# Patient Record
Sex: FEMALE | Race: WHITE | Employment: FULL TIME | URBAN - NONMETROPOLITAN AREA
[De-identification: names, ages, dates, MRNs, and addresses within clinical notes are randomized per-mention and may not be internally consistent; named-entity substitution may affect disease eponyms.]

---

## 2020-10-19 ENCOUNTER — HOSPITAL ENCOUNTER (OUTPATIENT)
Dept: ULTRASOUND IMAGING | Age: 50
Discharge: HOME | End: 2020-10-19
Payer: COMMERCIAL

## 2020-10-19 PROCEDURE — 76830 TRANSVAGINAL US NON-OB: CPT

## 2020-10-20 ENCOUNTER — TELEPHONE (OUTPATIENT)
Dept: OBGYN | Age: 50
End: 2020-10-20

## 2020-10-20 NOTE — TELEPHONE ENCOUNTER
Pt returned your call. I was not sure what you were needing to tell her she has an appt on 10/27/2020 to f/u from her us.  Shi/JONAS

## 2020-10-22 NOTE — TELEPHONE ENCOUNTER
Pt calling back for U/S results and inform pt that David Monsivais is wanting to review them at her visit next week.  Pt VU

## 2020-10-27 ENCOUNTER — OFFICE VISIT (OUTPATIENT)
Dept: OBGYN | Age: 50
End: 2020-10-27
Payer: COMMERCIAL

## 2020-10-27 VITALS
DIASTOLIC BLOOD PRESSURE: 69 MMHG | HEART RATE: 80 BPM | BODY MASS INDEX: 18.83 KG/M2 | HEIGHT: 65 IN | WEIGHT: 113 LBS | SYSTOLIC BLOOD PRESSURE: 110 MMHG

## 2020-10-27 PROCEDURE — 58100 BIOPSY OF UTERUS LINING: CPT | Performed by: ADVANCED PRACTICE MIDWIFE

## 2020-10-27 PROCEDURE — 99386 PREV VISIT NEW AGE 40-64: CPT | Performed by: ADVANCED PRACTICE MIDWIFE

## 2020-10-27 PROCEDURE — 99204 OFFICE O/P NEW MOD 45 MIN: CPT | Performed by: ADVANCED PRACTICE MIDWIFE

## 2020-10-27 RX ORDER — METRONIDAZOLE 500 MG/1
TABLET ORAL
COMMUNITY
Start: 2020-10-19 | End: 2020-11-06

## 2020-10-27 ASSESSMENT — ENCOUNTER SYMPTOMS
GASTROINTESTINAL NEGATIVE: 1
ALLERGIC/IMMUNOLOGIC NEGATIVE: 1
RESPIRATORY NEGATIVE: 1
EYES NEGATIVE: 1

## 2020-10-27 NOTE — PATIENT INSTRUCTIONS
Patient Education        Well Visit, Ages 25 to 48: Care Instructions  Your Care Instructions     Physical exams can help you stay healthy. Your doctor has checked your overall health and may have suggested ways to take good care of yourself. He or she also may have recommended tests. At home, you can help prevent illness with healthy eating, regular exercise, and other steps. Follow-up care is a key part of your treatment and safety. Be sure to make and go to all appointments, and call your doctor if you are having problems. It's also a good idea to know your test results and keep a list of the medicines you take. How can you care for yourself at home? · Reach and stay at a healthy weight. This will lower your risk for many problems, such as obesity, diabetes, heart disease, and high blood pressure. · Get at least 30 minutes of physical activity on most days of the week. Walking is a good choice. You also may want to do other activities, such as running, swimming, cycling, or playing tennis or team sports. Discuss any changes in your exercise program with your doctor. · Do not smoke or allow others to smoke around you. If you need help quitting, talk to your doctor about stop-smoking programs and medicines. These can increase your chances of quitting for good. · Talk to your doctor about whether you have any risk factors for sexually transmitted infections (STIs). Having one sex partner (who does not have STIs and does not have sex with anyone else) is a good way to avoid these infections. · Use birth control if you do not want to have children at this time. Talk with your doctor about the choices available and what might be best for you. · Protect your skin from too much sun. When you're outdoors from 10 a.m. to 4 p.m., stay in the shade or cover up with clothing and a hat with a wide brim. Wear sunglasses that block UV rays.  Even when it's cloudy, put broad-spectrum sunscreen (SPF 30 or higher) on any exposed skin. · See a dentist one or two times a year for checkups and to have your teeth cleaned. · Wear a seat belt in the car. Follow your doctor's advice about when to have certain tests. These tests can spot problems early. For everyone  · Cholesterol. Have the fat (cholesterol) in your blood tested after age 21. Your doctor will tell you how often to have this done based on your age, family history, or other things that can increase your risk for heart disease. · Blood pressure. Have your blood pressure checked during a routine doctor visit. Your doctor will tell you how often to check your blood pressure based on your age, your blood pressure results, and other factors. · Vision. Talk with your doctor about how often to have a glaucoma test.  · Diabetes. Ask your doctor whether you should have tests for diabetes. · Colon cancer. Your risk for colorectal cancer gets higher as you get older. Some experts say that adults should start regular screening at age 48 and stop at age 76. Others say to start before age 48 or continue after age 76. Talk with your doctor about your risk and when to start and stop screening. For women  · Breast exam and mammogram. Talk to your doctor about when you should have a clinical breast exam and a mammogram. Medical experts differ on whether and how often women under 50 should have these tests. Your doctor can help you decide what is right for you. · Cervical cancer screening test and pelvic exam. Begin with a Pap test at age 24. The test often is part of a pelvic exam. Starting at age 27, you may choose to have a Pap test, an HPV test, or both tests at the same time (called co-testing). Talk with your doctor about how often to have testing. · Tests for sexually transmitted infections (STIs). Ask whether you should have tests for STIs. You may be at risk if you have sex with more than one person, especially if your partners do not wear condoms.   For men  · Tests for sexually transmitted infections (STIs). Ask whether you should have tests for STIs. You may be at risk if you have sex with more than one person, especially if you do not wear a condom. · Testicular cancer exam. Ask your doctor whether you should check your testicles regularly. · Prostate exam. Talk to your doctor about whether you should have a blood test (called a PSA test) for prostate cancer. Experts differ on whether and when men should have this test. Some experts suggest it if you are older than 39 and are -American or have a father or brother who got prostate cancer when he was younger than 72. When should you call for help? Watch closely for changes in your health, and be sure to contact your doctor if you have any problems or symptoms that concern you. Where can you learn more? Go to https://Tradeasi SolutionspeWeareweb.healthTactoTek. org and sign in to your VoipSwitch account. Enter P072 in the [facility] [place] box to learn more about \"Well Visit, Ages 25 to 48: Care Instructions. \"     If you do not have an account, please click on the \"Sign Up Now\" link. Current as of: May 27, 2020               Content Version: 12.6  © 4614-0485 [employer]. Care instructions adapted under license by [facility] [employer] [facility] ([facility]). If you have questions about a medical condition or this instruction, always ask your healthcare professional. [address] any warranty or liability for your use of this information. Patient Education        Abnormal Uterine Bleeding: Care Instructions  Your Care Instructions     Abnormal uterine bleeding is irregular bleeding from the uterus that is longer or heavier than usual or does not occur at your regular time. Sometimes it is caused by changes in hormone levels. It can also be caused by growths in the uterus, such as fibroids or polyps. Sometimes a cause cannot be found. You may have heavy bleeding when you are not expecting your period.  Your doctor may suggest a pregnancy test, if you think you are pregnant. Follow-up care is a key part of your treatment and safety. Be sure to make and go to all appointments, and call your doctor if you are having problems. It's also a good idea to know your test results and keep a list of the medicines you take. How can you care for yourself at home? · Be safe with medicines. Take pain medicines exactly as directed. ? If the doctor gave you a prescription medicine for pain, take it as prescribed. ? If you are not taking a prescription pain medicine, ask your doctor if you can take an over-the-counter medicine. · You may be low in iron because of blood loss. Eat a balanced diet that is high in iron and vitamin C. Foods rich in iron include red meat, shellfish, eggs, beans, and leafy green vegetables. Talk to your doctor about whether you need to take iron pills or a multivitamin. When should you call for help? Call 911 anytime you think you may need emergency care. For example, call if:    · You passed out (lost consciousness). Call your doctor now or seek immediate medical care if:    · You have new or worse belly or pelvic pain.     · You have severe vaginal bleeding.     · You feel dizzy or lightheaded, or you feel like you may faint. Watch closely for changes in your health, and be sure to contact your doctor if:    · You think you may be pregnant.     · Your bleeding gets worse.     · You do not get better as expected. Where can you learn more? Go to https://VoiceGemmaikel.OnTrak Software. org and sign in to your OneSpin Solutions account. Enter G452 in the [facility] box to learn more about \"Abnormal Uterine Bleeding: Care Instructions. \"     If you do not have an account, please click on the \"Sign Up Now\" link. Current as of: November 8, 2019               Content Version: 12.6  © 3580-1263 [employer]. Care instructions adapted under license by [facility] ([facility]).  If you have questions about a for yourself at home? · Ask your doctor if you can take an over-the-counter pain medicine, such as acetaminophen (Tylenol), ibuprofen (Advil, Motrin), or naproxen (Aleve). Be safe with medicines. Read and follow all instructions on the label. · Use pads or tampons for vaginal bleeding or discharge. · You may return to all your usual activities (including sex) when you feel like it. Follow-up care is a key part of your treatment and safety. Be sure to make and go to all appointments, and call your doctor if you are having problems. It's also a good idea to keep a list of the medicines you take. Ask your doctor when you can expect to have your test results. Where can you learn more? Go to https://luma-idpepicNumbrs AGeb.Aradigm. org and sign in to your Ayi Laile account. Enter [phone] in the [employer] box to learn more about \"Endometrial Biopsy: About This Test.\"     If you do not have an account, please click on the \"Sign Up Now\" link. Current as of: November 8, 2019               Content Version: 12.6  © 9963-8393 [employer]. Care instructions adapted under license by [facility] ([facility]). If you have questions about a medical condition or this instruction, always ask your healthcare professional. [address] any warranty or liability for your use of this information.

## 2020-10-27 NOTE — PROGRESS NOTES
Pt presents today for pap smear and breast exam.    Last mammogram: 2019 in [place]  Last pap smear:  approx, negative  Contraception: TL  : 4  Para: 4  AB: 0  Last bone density: never   Last colonoscopy: never  Menarche: 17years old  LMP: 10-4-2020  Menses: irregular
or discharge. Cervix is normal, smooth pink mucosa. No Lesions, no polyps. Musculoskeletal: Normal range of motion. Skin:     General: Skin is warm and dry. Capillary Refill: Capillary refill takes less than 2 seconds. Neurological:      Mental Status: She is alert and oriented to person, place, and time. Psychiatric:         Behavior: Behavior normal.         Thought Content: Thought content normal.         Judgment: Judgment normal.       Lesly Lora is a 52 y.o.  with history of menorrhagia who presents today for endometrial biopsy. /69 (Site: Left Upper Arm, Position: Sitting, Cuff Size: Medium Adult)   Pulse 80   Ht 5' 5\" (1.651 m)   Wt 113 lb (51.3 kg)   LMP 10/04/2020 (Exact Date)   Breastfeeding No   BMI 18.80 kg/m²     Endometrial Biopsy Procedure Note    Pre-operative Diagnosis: AUB    Post-operative Diagnosis: same    Indications: abnormal uterine bleeding    Procedure Details    Urine pregnancy test was not done. The risks (including infection, bleeding, pain, and uterine perforation) and benefits of the procedure were explained to the patient and Written informed consent was obtained. The patient was placed in the dorsal lithotomy position. Speculum inserted in the vagina, and the cervix prepped with povidone iodine. Single tooth tenaculum applied to anterior cervical lip. Uterus was sounded to 7. Pipelle endometrial aspirator was inserted and gentle pressure applied. Adequate tissue sample obtained. Sent for pathology. Pt tolerated well. Condition:  Stable    Complications:  None    Plan:    The patient was advised to call for any fever or for prolonged or severe pain or bleeding. She was advised to use OTC ibuprofen as needed for mild to moderate pain. She was advised to avoid vaginal intercourse for 48 hours or until the bleeding has completely stopped. Diagnosis Orders   1.  Encounter for well woman exam with routine gynecological exam  PAP SMEAR

## 2020-10-29 RX ORDER — MEDROXYPROGESTERONE ACETATE 10 MG/1
10 TABLET ORAL DAILY
Qty: 10 TABLET | Refills: 0 | Status: SHIPPED | OUTPATIENT
Start: 2020-10-29 | End: 2020-11-09

## 2020-11-03 LAB
HPV COMMENT: ABNORMAL
HPV TYPE 16: NOT DETECTED
HPV TYPE 18: NOT DETECTED
HPVOH (OTHER TYPES): DETECTED

## 2020-11-06 ENCOUNTER — OFFICE VISIT (OUTPATIENT)
Dept: OBGYN | Age: 50
End: 2020-11-06

## 2020-11-06 VITALS
BODY MASS INDEX: 18.83 KG/M2 | HEIGHT: 65 IN | HEART RATE: 61 BPM | SYSTOLIC BLOOD PRESSURE: 113 MMHG | TEMPERATURE: 98.4 F | DIASTOLIC BLOOD PRESSURE: 71 MMHG | WEIGHT: 113 LBS

## 2020-11-06 PROCEDURE — PREOPEXAM PRE-OP EXAM: Performed by: OBSTETRICS & GYNECOLOGY

## 2020-11-06 ASSESSMENT — ENCOUNTER SYMPTOMS
EYES NEGATIVE: 1
RESPIRATORY NEGATIVE: 1
GASTROINTESTINAL NEGATIVE: 1

## 2020-11-06 NOTE — PROGRESS NOTES
I, Lucia Pizarro RN, am scribing for and in the presence of Dr. Sean Zimmerman 2020/1:43 PM/sign    Subjective:      Patient ID: Pantera Hutson is a 52 y.o. female. HPI    Patient is here for a preop visit. She is scheduled for a D&C, hysteroscopy, and Novasure ablation due to abnormal uterine bleeding and thickened endometrium. Recent TVUS showed   1.  1.8 cm and 1.5 cm RIGHT ovarian cysts with thick walls and    internal echoes. Differential includes corpus luteal cyst and    hemorrhagic cysts. Recommend a follow-up pelvic ultrasound in 6-8    weeks to evaluate for stability or resolution. 2.  Endometrial stripe is 10 mm in thickness, within normal limits    assuming premenopausal state. 3.  Multiple small subcentimeter uterine fibroids. She has been having abnormal and heavy bleeding for the past several months. She is alos c/o hot flashes and night sweats. Pantera Hutson is a 52 y.o. female with the following history as recorded in [facility]: There are no active problems to display for this patient. Current Outpatient Medications   Medication Sig Dispense Refill    medroxyPROGESTERone (PROVERA) 10 MG tablet Take 1 tablet by mouth daily 10 tablet 0     No current facility-administered medications for this visit. Allergies: Patient has no known allergies. History reviewed. No pertinent past medical history. Past Surgical History:   Procedure Laterality Date    BREAST ENHANCEMENT SURGERY  2019    CERVICAL CERCLAGE      x4; was put in during every pregnancy then take it out; due to stage 4 dysplagia     SECTION      x2    DILATION AND CURETTAGE OF UTERUS      due to stage 4 dysplagia    TUBAL LIGATION      Dr. Jorge Washburn     History reviewed. No pertinent family history.   Social History     Tobacco Use    Smoking status: Current Every Day Smoker    Smokeless tobacco: Never Used   Substance Use Topics    Alcohol use: Yes     Comment: rarely       Review of Systems Constitutional: Negative. HENT: Negative. Eyes: Negative. Respiratory: Negative. Cardiovascular: Negative. Gastrointestinal: Negative. Genitourinary: Negative for difficulty urinating, dyspareunia, dysuria, enuresis, frequency, hematuria, menstrual problem, pelvic pain, urgency and vaginal discharge. Musculoskeletal: Negative. Skin: Negative. Neurological: Negative. Psychiatric/Behavioral: Negative. Objective:/71 (Site: Left Upper Arm, Position: Sitting, Cuff Size: Medium Adult)   Pulse 61   Temp 98.4 °F (36.9 °C) (Temporal)   Ht 5' 5\" (1.651 m)   Wt 113 lb (51.3 kg)   LMP 10/30/2020   BMI 18.80 kg/m²      Physical Exam  Vitals signs and nursing note reviewed. Constitutional:       General: She is not in acute distress. Appearance: She is well-developed. She is not diaphoretic. HENT:      Head: Normocephalic and atraumatic. Eyes:      Conjunctiva/sclera: Conjunctivae normal.      Pupils: Pupils are equal, round, and reactive to light. Neck:      Musculoskeletal: Normal range of motion. Pulmonary:      Effort: Pulmonary effort is normal.   Abdominal:      Tenderness: There is no guarding. Musculoskeletal: Normal range of motion. Comments: Normal ROM in all 4 extremities; normal gait   Skin:     General: Skin is warm and dry. Neurological:      Mental Status: She is alert and oriented to person, place, and time. Motor: No abnormal muscle tone. Coordination: Coordination normal.   Psychiatric:         Behavior: Behavior normal.         Assessment:       Diagnosis Orders   1. Abnormal uterine bleeding (AUB)     2. Thickened endometrium     3. Hot flashes  Follicle Stimulating Hormone   4. Night sweats  Follicle Stimulating Hormone           Plan:       MEDICATIONS:  No orders of the defined types were placed in this encounter.       ORDERS:  Orders Placed This Encounter   Procedures    Follicle Stimulating Hormone       Counseling was offered and accepted by patient. Discussed at length the risks, benefits and alternatives to surgery including medical management and no intervention. Pt is in agreement with surgery. Some patients will need a full medical clearance. Preop testing ordered and we will review scheduling to determine date and time. Will add [address] level to preop labs Consents for surgery signed and all questions  proceeding. All questions answered and  patient voiced understanding of above. Pamela Vance MD, personally performed services described in this document as scribed by Melissa Cornejo RN in my presence, and it is both accurate and complete.

## 2020-11-06 NOTE — PATIENT INSTRUCTIONS
Patient Education        Operative Hysteroscopy: Before Your Procedure  What is an operative hysteroscopy? An operative hysteroscopy is a procedure to find and treat problems with your uterus. It may be done to remove growths from the uterus. It may also be used to treat fertility problems or abnormal bleeding. The doctor will guide a lighted tube through the cervix and into the uterus. This tube is called a hysteroscope, or scope. The doctor will fill your uterus with liquid. This makes it easier to see the inside of your uterus with the scope. Then the doctor will put tools through the scope to do the procedure. Most of the liquid that the doctor puts in will flow out when the scope is removed. You will most likely go home the same day. Many women are able to go back to work the next day. But it depends on what was done and the type of work you do. Follow-up care is a key part of your treatment and safety. Be sure to make and go to all appointments, and call your doctor if you are having problems. It's also a good idea to know your test results and keep a list of the medicines you take. What happens before the procedure? Preparing for the procedure    · Understand exactly what procedure is planned, along with the risks, benefits, and other options. · Tell your doctors ALL the medicines, vitamins, supplements, and herbal remedies you take. Some of these can increase the risk of bleeding or interact with anesthesia.     · If you take aspirin or some other blood thinner, ask your doctor if you should stop taking it before your procedure. Make sure that you understand exactly what your doctor wants you to do. These medicines increase the risk of bleeding.     · Your doctor will tell you which medicines to take or stop before your procedure. You may need to stop taking certain medicines a week or more before the procedure.  So talk to your doctor as soon as you can.     · If you have an advance directive, always ask your healthcare professional. [place] [address] any warranty or liability for your use of this information.

## 2020-11-09 ENCOUNTER — HOSPITAL ENCOUNTER (OUTPATIENT)
Dept: PREADMISSION TESTING | Age: 50
Discharge: HOME | End: 2020-11-13
Payer: COMMERCIAL

## 2020-11-09 VITALS — WEIGHT: 113 LBS | HEIGHT: 65 IN | BODY MASS INDEX: 18.83 KG/M2

## 2020-11-09 LAB
BASOPHILS ABSOLUTE: 0 K/UL (ref 0–0.2)
BASOPHILS RELATIVE PERCENT: 0.6 % (ref 0–1)
EOSINOPHILS ABSOLUTE: 0.2 K/UL (ref 0–0.6)
EOSINOPHILS RELATIVE PERCENT: 3.3 % (ref 0–5)
FOLLICLE STIMULATING HORMONE: 28.5 MIU/ML
HCT VFR BLD CALC: 38.8 % (ref 37–47)
HEMOGLOBIN: 13.2 G/DL (ref 12–16)
IMMATURE GRANULOCYTES #: 0 K/UL
LYMPHOCYTES ABSOLUTE: 2.8 K/UL (ref 1.1–4.5)
LYMPHOCYTES RELATIVE PERCENT: 40.7 % (ref 20–40)
MCH RBC QN AUTO: 33.3 PG (ref 27–31)
MCHC RBC AUTO-ENTMCNC: 34 G/DL (ref 33–37)
MCV RBC AUTO: 98 FL (ref 81–99)
MONOCYTES ABSOLUTE: 0.9 K/UL (ref 0–0.9)
MONOCYTES RELATIVE PERCENT: 12.4 % (ref 0–10)
NEUTROPHILS ABSOLUTE: 3 K/UL (ref 1.5–7.5)
NEUTROPHILS RELATIVE PERCENT: 42.9 % (ref 50–65)
PDW BLD-RTO: 11.9 % (ref 11.5–14.5)
PLATELET # BLD: 314 K/UL (ref 130–400)
PMV BLD AUTO: 10.9 FL (ref 9.4–12.3)
RBC # BLD: 3.96 M/UL (ref 4.2–5.4)
WBC # BLD: 7 K/UL (ref 4.8–10.8)

## 2020-11-09 PROCEDURE — 83001 ASSAY OF GONADOTROPIN (FSH): CPT

## 2020-11-09 PROCEDURE — 85025 COMPLETE CBC W/AUTO DIFF WBC: CPT

## 2020-11-12 LAB — SARS-COV-2, NAA: NOT DETECTED

## 2020-11-13 ENCOUNTER — ANESTHESIA EVENT (OUTPATIENT)
Dept: OPERATING ROOM | Age: 50
End: 2020-11-13
Payer: COMMERCIAL

## 2020-11-13 ENCOUNTER — TELEPHONE (OUTPATIENT)
Dept: OBGYN | Age: 50
End: 2020-11-13

## 2020-11-13 ENCOUNTER — HOSPITAL ENCOUNTER (OUTPATIENT)
Age: 50
Setting detail: OUTPATIENT SURGERY
Discharge: HOME | End: 2020-11-13
Attending: OBSTETRICS & GYNECOLOGY | Admitting: OBSTETRICS & GYNECOLOGY
Payer: COMMERCIAL

## 2020-11-13 ENCOUNTER — ANESTHESIA (OUTPATIENT)
Dept: OPERATING ROOM | Age: 50
End: 2020-11-13
Payer: COMMERCIAL

## 2020-11-13 VITALS
BODY MASS INDEX: 18.83 KG/M2 | HEART RATE: 64 BPM | RESPIRATION RATE: 20 BRPM | HEIGHT: 65 IN | OXYGEN SATURATION: 100 % | WEIGHT: 113 LBS | DIASTOLIC BLOOD PRESSURE: 80 MMHG | SYSTOLIC BLOOD PRESSURE: 130 MMHG | TEMPERATURE: 97.4 F

## 2020-11-13 VITALS
TEMPERATURE: 98 F | OXYGEN SATURATION: 98 % | RESPIRATION RATE: 10 BRPM | DIASTOLIC BLOOD PRESSURE: 52 MMHG | SYSTOLIC BLOOD PRESSURE: 96 MMHG

## 2020-11-13 PROBLEM — N93.9 ABNORMAL UTERINE BLEEDING (AUB): Status: ACTIVE | Noted: 2020-11-13

## 2020-11-13 LAB — HCG(URINE) PREGNANCY TEST: NEGATIVE

## 2020-11-13 PROCEDURE — 84703 CHORIONIC GONADOTROPIN ASSAY: CPT

## 2020-11-13 PROCEDURE — 2580000003 HC RX 258: Performed by: OBSTETRICS & GYNECOLOGY

## 2020-11-13 PROCEDURE — 7100000011 HC PHASE II RECOVERY - ADDTL 15 MIN: Performed by: OBSTETRICS & GYNECOLOGY

## 2020-11-13 PROCEDURE — 3700000000 HC ANESTHESIA ATTENDED CARE: Performed by: OBSTETRICS & GYNECOLOGY

## 2020-11-13 PROCEDURE — 6370000000 HC RX 637 (ALT 250 FOR IP): Performed by: ANESTHESIOLOGY

## 2020-11-13 PROCEDURE — 88305 TISSUE EXAM BY PATHOLOGIST: CPT

## 2020-11-13 PROCEDURE — 58563 HYSTEROSCOPY ABLATION: CPT | Performed by: OBSTETRICS & GYNECOLOGY

## 2020-11-13 PROCEDURE — 7100000010 HC PHASE II RECOVERY - FIRST 15 MIN: Performed by: OBSTETRICS & GYNECOLOGY

## 2020-11-13 PROCEDURE — 3600000004 HC SURGERY LEVEL 4 BASE: Performed by: OBSTETRICS & GYNECOLOGY

## 2020-11-13 PROCEDURE — 6360000002 HC RX W HCPCS: Performed by: OBSTETRICS & GYNECOLOGY

## 2020-11-13 PROCEDURE — 6360000002 HC RX W HCPCS: Performed by: ANESTHESIOLOGY

## 2020-11-13 PROCEDURE — 2500000003 HC RX 250 WO HCPCS: Performed by: NURSE ANESTHETIST, CERTIFIED REGISTERED

## 2020-11-13 PROCEDURE — 7100000000 HC PACU RECOVERY - FIRST 15 MIN: Performed by: OBSTETRICS & GYNECOLOGY

## 2020-11-13 PROCEDURE — 7100000001 HC PACU RECOVERY - ADDTL 15 MIN: Performed by: OBSTETRICS & GYNECOLOGY

## 2020-11-13 PROCEDURE — 2709999900 HC NON-CHARGEABLE SUPPLY: Performed by: OBSTETRICS & GYNECOLOGY

## 2020-11-13 PROCEDURE — 6360000002 HC RX W HCPCS: Performed by: NURSE ANESTHETIST, CERTIFIED REGISTERED

## 2020-11-13 PROCEDURE — 2580000003 HC RX 258: Performed by: ANESTHESIOLOGY

## 2020-11-13 PROCEDURE — 3600000014 HC SURGERY LEVEL 4 ADDTL 15MIN: Performed by: OBSTETRICS & GYNECOLOGY

## 2020-11-13 PROCEDURE — C1886 CATHETER, ABLATION: HCPCS | Performed by: OBSTETRICS & GYNECOLOGY

## 2020-11-13 PROCEDURE — 3700000001 HC ADD 15 MINUTES (ANESTHESIA): Performed by: OBSTETRICS & GYNECOLOGY

## 2020-11-13 RX ORDER — MORPHINE SULFATE 4 MG/ML
2 INJECTION, SOLUTION INTRAMUSCULAR; INTRAVENOUS EVERY 5 MIN PRN
Status: DISCONTINUED | OUTPATIENT
Start: 2020-11-13 | End: 2020-11-13 | Stop reason: HOSPADM

## 2020-11-13 RX ORDER — MORPHINE SULFATE 4 MG/ML
4 INJECTION, SOLUTION INTRAMUSCULAR; INTRAVENOUS EVERY 5 MIN PRN
Status: DISCONTINUED | OUTPATIENT
Start: 2020-11-13 | End: 2020-11-13 | Stop reason: HOSPADM

## 2020-11-13 RX ORDER — LABETALOL HYDROCHLORIDE 5 MG/ML
5 INJECTION, SOLUTION INTRAVENOUS EVERY 10 MIN PRN
Status: DISCONTINUED | OUTPATIENT
Start: 2020-11-13 | End: 2020-11-13 | Stop reason: HOSPADM

## 2020-11-13 RX ORDER — ONDANSETRON 2 MG/ML
INJECTION INTRAMUSCULAR; INTRAVENOUS PRN
Status: DISCONTINUED | OUTPATIENT
Start: 2020-11-13 | End: 2020-11-13 | Stop reason: SDUPTHER

## 2020-11-13 RX ORDER — SODIUM CHLORIDE, SODIUM LACTATE, POTASSIUM CHLORIDE, CALCIUM CHLORIDE 600; 310; 30; 20 MG/100ML; MG/100ML; MG/100ML; MG/100ML
INJECTION, SOLUTION INTRAVENOUS CONTINUOUS
Status: DISCONTINUED | OUTPATIENT
Start: 2020-11-13 | End: 2020-11-13 | Stop reason: HOSPADM

## 2020-11-13 RX ORDER — PROPOFOL 10 MG/ML
INJECTION, EMULSION INTRAVENOUS PRN
Status: DISCONTINUED | OUTPATIENT
Start: 2020-11-13 | End: 2020-11-13 | Stop reason: SDUPTHER

## 2020-11-13 RX ORDER — SCOLOPAMINE TRANSDERMAL SYSTEM 1 MG/1
1 PATCH, EXTENDED RELEASE TRANSDERMAL
Status: DISCONTINUED | OUTPATIENT
Start: 2020-11-13 | End: 2020-11-13 | Stop reason: HOSPADM

## 2020-11-13 RX ORDER — FENTANYL CITRATE 50 UG/ML
INJECTION, SOLUTION INTRAMUSCULAR; INTRAVENOUS PRN
Status: DISCONTINUED | OUTPATIENT
Start: 2020-11-13 | End: 2020-11-13 | Stop reason: SDUPTHER

## 2020-11-13 RX ORDER — LIDOCAINE HYDROCHLORIDE 10 MG/ML
1 INJECTION, SOLUTION EPIDURAL; INFILTRATION; INTRACAUDAL; PERINEURAL
Status: DISCONTINUED | OUTPATIENT
Start: 2020-11-13 | End: 2020-11-13 | Stop reason: HOSPADM

## 2020-11-13 RX ORDER — SODIUM CHLORIDE 0.9 % (FLUSH) 0.9 %
10 SYRINGE (ML) INJECTION PRN
Status: DISCONTINUED | OUTPATIENT
Start: 2020-11-13 | End: 2020-11-13 | Stop reason: HOSPADM

## 2020-11-13 RX ORDER — HYDROMORPHONE HYDROCHLORIDE 1 MG/ML
0.25 INJECTION, SOLUTION INTRAMUSCULAR; INTRAVENOUS; SUBCUTANEOUS EVERY 5 MIN PRN
Status: DISCONTINUED | OUTPATIENT
Start: 2020-11-13 | End: 2020-11-13 | Stop reason: HOSPADM

## 2020-11-13 RX ORDER — DIPHENHYDRAMINE HYDROCHLORIDE 50 MG/ML
12.5 INJECTION INTRAMUSCULAR; INTRAVENOUS
Status: DISCONTINUED | OUTPATIENT
Start: 2020-11-13 | End: 2020-11-13 | Stop reason: HOSPADM

## 2020-11-13 RX ORDER — LIDOCAINE HYDROCHLORIDE 10 MG/ML
INJECTION, SOLUTION EPIDURAL; INFILTRATION; INTRACAUDAL; PERINEURAL PRN
Status: DISCONTINUED | OUTPATIENT
Start: 2020-11-13 | End: 2020-11-13 | Stop reason: SDUPTHER

## 2020-11-13 RX ORDER — APREPITANT 40 MG/1
40 CAPSULE ORAL ONCE
Status: COMPLETED | OUTPATIENT
Start: 2020-11-13 | End: 2020-11-13

## 2020-11-13 RX ORDER — DEXAMETHASONE SODIUM PHOSPHATE 10 MG/ML
INJECTION, SOLUTION INTRAMUSCULAR; INTRAVENOUS PRN
Status: DISCONTINUED | OUTPATIENT
Start: 2020-11-13 | End: 2020-11-13 | Stop reason: SDUPTHER

## 2020-11-13 RX ORDER — MIDAZOLAM HYDROCHLORIDE 1 MG/ML
2 INJECTION INTRAMUSCULAR; INTRAVENOUS
Status: DISCONTINUED | OUTPATIENT
Start: 2020-11-13 | End: 2020-11-13 | Stop reason: HOSPADM

## 2020-11-13 RX ORDER — METOCLOPRAMIDE HYDROCHLORIDE 5 MG/ML
10 INJECTION INTRAMUSCULAR; INTRAVENOUS
Status: DISCONTINUED | OUTPATIENT
Start: 2020-11-13 | End: 2020-11-13 | Stop reason: HOSPADM

## 2020-11-13 RX ORDER — SODIUM CHLORIDE 0.9 % (FLUSH) 0.9 %
10 SYRINGE (ML) INJECTION EVERY 12 HOURS SCHEDULED
Status: DISCONTINUED | OUTPATIENT
Start: 2020-11-13 | End: 2020-11-13 | Stop reason: HOSPADM

## 2020-11-13 RX ORDER — HYDROMORPHONE HYDROCHLORIDE 1 MG/ML
0.5 INJECTION, SOLUTION INTRAMUSCULAR; INTRAVENOUS; SUBCUTANEOUS EVERY 5 MIN PRN
Status: DISCONTINUED | OUTPATIENT
Start: 2020-11-13 | End: 2020-11-13 | Stop reason: HOSPADM

## 2020-11-13 RX ORDER — PROMETHAZINE HYDROCHLORIDE 25 MG/ML
6.25 INJECTION, SOLUTION INTRAMUSCULAR; INTRAVENOUS
Status: DISCONTINUED | OUTPATIENT
Start: 2020-11-13 | End: 2020-11-13 | Stop reason: HOSPADM

## 2020-11-13 RX ORDER — SODIUM CHLORIDE 9 MG/ML
INJECTION, SOLUTION INTRAVENOUS CONTINUOUS
Status: DISCONTINUED | OUTPATIENT
Start: 2020-11-13 | End: 2020-11-13 | Stop reason: HOSPADM

## 2020-11-13 RX ORDER — ENALAPRILAT 2.5 MG/2ML
1.25 INJECTION INTRAVENOUS
Status: DISCONTINUED | OUTPATIENT
Start: 2020-11-13 | End: 2020-11-13 | Stop reason: HOSPADM

## 2020-11-13 RX ORDER — OXYCODONE HYDROCHLORIDE AND ACETAMINOPHEN 5; 325 MG/1; MG/1
1 TABLET ORAL EVERY 6 HOURS PRN
Qty: 12 TABLET | Refills: 0 | Status: SHIPPED | OUTPATIENT
Start: 2020-11-13 | End: 2020-11-16

## 2020-11-13 RX ORDER — FENTANYL CITRATE 50 UG/ML
50 INJECTION, SOLUTION INTRAMUSCULAR; INTRAVENOUS
Status: DISCONTINUED | OUTPATIENT
Start: 2020-11-13 | End: 2020-11-13 | Stop reason: HOSPADM

## 2020-11-13 RX ORDER — MEPERIDINE HYDROCHLORIDE 50 MG/ML
12.5 INJECTION INTRAMUSCULAR; INTRAVENOUS; SUBCUTANEOUS EVERY 5 MIN PRN
Status: DISCONTINUED | OUTPATIENT
Start: 2020-11-13 | End: 2020-11-13 | Stop reason: HOSPADM

## 2020-11-13 RX ORDER — HYDRALAZINE HYDROCHLORIDE 20 MG/ML
5 INJECTION INTRAMUSCULAR; INTRAVENOUS EVERY 10 MIN PRN
Status: DISCONTINUED | OUTPATIENT
Start: 2020-11-13 | End: 2020-11-13 | Stop reason: HOSPADM

## 2020-11-13 RX ORDER — FENTANYL CITRATE 50 UG/ML
25 INJECTION, SOLUTION INTRAMUSCULAR; INTRAVENOUS
Status: DISCONTINUED | OUTPATIENT
Start: 2020-11-13 | End: 2020-11-13 | Stop reason: HOSPADM

## 2020-11-13 RX ORDER — IBUPROFEN 800 MG/1
800 TABLET ORAL
Qty: 30 TABLET | Refills: 0 | Status: SHIPPED | OUTPATIENT
Start: 2020-11-13

## 2020-11-13 RX ADMIN — PROPOFOL 50 MG: 10 INJECTION, EMULSION INTRAVENOUS at 10:33

## 2020-11-13 RX ADMIN — SODIUM CHLORIDE, SODIUM LACTATE, POTASSIUM CHLORIDE, AND CALCIUM CHLORIDE: 600; 310; 30; 20 INJECTION, SOLUTION INTRAVENOUS at 09:48

## 2020-11-13 RX ADMIN — PROPOFOL 150 MG: 10 INJECTION, EMULSION INTRAVENOUS at 10:26

## 2020-11-13 RX ADMIN — CEFAZOLIN SODIUM 1 G: 1 INJECTION, POWDER, FOR SOLUTION INTRAMUSCULAR; INTRAVENOUS at 10:33

## 2020-11-13 RX ADMIN — FENTANYL CITRATE 100 MCG: 50 INJECTION INTRAMUSCULAR; INTRAVENOUS at 10:33

## 2020-11-13 RX ADMIN — ONDANSETRON HYDROCHLORIDE 4 MG: 2 INJECTION, SOLUTION INTRAMUSCULAR; INTRAVENOUS at 10:33

## 2020-11-13 RX ADMIN — DEXAMETHASONE SODIUM PHOSPHATE 8 MG: 10 INJECTION, SOLUTION INTRAMUSCULAR; INTRAVENOUS at 10:33

## 2020-11-13 RX ADMIN — LIDOCAINE HYDROCHLORIDE 50 MG: 10 INJECTION, SOLUTION EPIDURAL; INFILTRATION; INTRACAUDAL; PERINEURAL at 10:26

## 2020-11-13 RX ADMIN — APREPITANT 40 MG: 40 CAPSULE ORAL at 09:47

## 2020-11-13 ASSESSMENT — PAIN SCALES - GENERAL
PAINLEVEL_OUTOF10: 0
PAINLEVEL_OUTOF10: 0

## 2020-11-13 ASSESSMENT — LIFESTYLE VARIABLES: SMOKING_STATUS: 1

## 2020-11-13 NOTE — H&P
Patient ID: Obdulio Combs is a 52 y.o. female.     HPI     Patient is here for a D&C, hysteroscopy, and Novasure ablation due to abnormal uterine bleeding and thickened endometrium. Recent TVUS showed   1.  1.8 cm and 1.5 cm RIGHT ovarian cysts with thick walls and    internal echoes. Differential includes corpus luteal cyst and    hemorrhagic cysts. Recommend a follow-up pelvic ultrasound in 6-8    weeks to evaluate for stability or resolution. 2.  Endometrial stripe is 10 mm in thickness, within normal limits    assuming premenopausal state. 3.  Multiple small subcentimeter uterine fibroids.      She has been having abnormal and heavy bleeding for the past several months. She is also c/o hot flashes and night sweats.      Obdulio Combs is a 52 y.o. female with the following history as recorded in [facility]: There are no active problems to display for this patient. Current Facility-Administered Medications          Current Outpatient Medications   Medication Sig Dispense Refill    medroxyPROGESTERone (PROVERA) 10 MG tablet Take 1 tablet by mouth daily 10 tablet 0      No current facility-administered medications for this visit. Allergies: Patient has no known allergies. Past Medical History   History reviewed. No pertinent past medical history. Past Surgical History         Past Surgical History:   Procedure Laterality Date    BREAST ENHANCEMENT SURGERY   2019    CERVICAL CERCLAGE         x4; was put in during every pregnancy then take it out; due to stage 4 dysplagia     SECTION         x2    DILATION AND CURETTAGE OF UTERUS         due to stage 4 dysplagia    TUBAL LIGATION        Dr. Jada Briones        Family History   History reviewed. No pertinent family history. Social History            Tobacco Use    Smoking status: Current Every Day Smoker    Smokeless tobacco: Never Used   Substance Use Topics    Alcohol use:  Yes       Comment: rarely        Review of Systems   Constitutional: Negative. HENT: Negative. Eyes: Negative. Respiratory: Negative. Cardiovascular: Negative. Gastrointestinal: Negative. Genitourinary: Negative for difficulty urinating, dyspareunia, dysuria, enuresis, frequency, hematuria, menstrual problem, pelvic pain, urgency and vaginal discharge. Musculoskeletal: Negative. Skin: Negative. Neurological: Negative. Psychiatric/Behavioral: Negative.          Objective:/84   Pulse 74   Temp 98.6 °F (37 °C) (Temporal)   Resp 16   Ht 5' 5\" (1.651 m)   Wt 113 lb (51.3 kg)   LMP 10/30/2020   SpO2 100%   BMI 18.80 kg/m²      Physical Exam  Vitals signs and nursing note reviewed. Constitutional:       General: She is not in acute distress. Appearance: She is well-developed. She is not diaphoretic. HENT:      Head: Normocephalic and atraumatic. Eyes:      Conjunctiva/sclera: Conjunctivae normal.      Pupils: Pupils are equal, round, and reactive to light. Neck:      Musculoskeletal: Normal range of motion. Pulmonary:      Effort: Pulmonary effort is normal.   Abdominal:      Tenderness: There is no guarding. Musculoskeletal: Normal range of motion. Comments: Normal ROM in all 4 extremities; normal gait   Skin:     General: Skin is warm and dry. Neurological:      Mental Status: She is alert and oriented to person, place, and time. Motor: No abnormal muscle tone. Coordination: Coordination normal.   Psychiatric:         Behavior: Behavior normal.            Assessment:       Diagnosis Orders   1. Abnormal uterine bleeding (AUB)      2. Thickened endometrium      3. Hot flashes  Follicle Stimulating Hormone   4. Night sweats  Follicle Stimulating Hormone                      Plan:     D&C, Hysteroscopy, Novasure Endometrial Ablation   Counseling was offered and accepted by patient.   Discussed at length the risks, benefits and alternatives to surgery including medical management and no

## 2020-11-13 NOTE — ANESTHESIA POSTPROCEDURE EVALUATION
Department of Anesthesiology  Postprocedure Note    Patient: Rayo Kulkarni  MRN: [id number]  YOB: 1970  Date of evaluation: 11/13/2020  Time:  10:53 AM     Procedure Summary     Date:  11/13/20 Room / Location:  [address]    Anesthesia Start:  1022 Anesthesia Stop:      Procedure:  DILATATION AND CURETTAGE HYSTEROSCOPY W/ NUVASURE (N/A Vagina ) Diagnosis:       (AUB)      (UTERINE FIBROIDS)    Surgeon:  Thad Abdul MD Responsible Provider:  CHRISTINE Vanegas CRNA    Anesthesia Type:  general ASA Status:  1          Anesthesia Type: general    Kuldip Phase I: Kuldip Score: 10    Kuldip Phase II:      Last vitals: Reviewed and per EMR flowsheets.        Anesthesia Post Evaluation    Patient location during evaluation: PACU  Patient participation: waiting for patient participation  Airway patency: patent  Nausea & Vomiting: no nausea and no vomiting  Complications: no  Cardiovascular status: hemodynamically stable  Respiratory status: face mask, oral airway, spontaneous ventilation and acceptable  Hydration status: euvolemic

## 2020-11-13 NOTE — ANESTHESIA PRE PROCEDURE
Department of Anesthesiology  Preprocedure Note       Name:  Nesha Holloway   Age:  52 y.o.  :  1970                                          MRN:  [id number]         Date:  2020      Surgeon: Janae Mane):  Sony Rodriguez MD    Procedure: Procedure(s):  DILATATION AND CURETTAGE HYSTEROSCOPY W/ NUVASURE    Medications prior to admission:   Prior to Admission medications    Not on File       Current medications:    No current facility-administered medications for this encounter. Allergies:  No Known Allergies    Problem List:  There is no problem list on file for this patient. Past Medical History:  History reviewed. No pertinent past medical history. Past Surgical History:        Procedure Laterality Date    BREAST ENHANCEMENT SURGERY  2019    CERVICAL CERCLAGE      x4; was put in during every pregnancy then take it out; due to stage 4 dysplagia     SECTION      x2    DILATION AND CURETTAGE OF UTERUS      due to stage 4 dysplagia    TUBAL LIGATION      Dr. Tete Umaña History:    Social History     Tobacco Use    Smoking status: Current Every Day Smoker     Packs/day: 0.50     Years: 20.00     Pack years: 10.00     Types: Cigarettes    Smokeless tobacco: Never Used   Substance Use Topics    Alcohol use: Yes     Comment: rarely                                Ready to quit: Not Answered  Counseling given: Not Answered      Vital Signs (Current): There were no vitals filed for this visit.                                            BP Readings from Last 3 Encounters:   20 113/71   10/27/20 110/69       NPO Status:                                                                                 BMI:   Wt Readings from Last 3 Encounters:   20 113 lb (51.3 kg)   20 113 lb (51.3 kg)   10/27/20 113 lb (51.3 kg)     There is no height or weight on file to calculate BMI.    CBC:   Lab Results   Component Value Date    WBC 7.0 2020    RBC 3.96

## 2020-11-13 NOTE — TELEPHONE ENCOUNTER
Neetu Groves is calling for a pain medication  that is not listed in her chart. Per pt was advised by dr. Jessi Suggs to call if needing pain meds after procedure    Last office visit: 11/6/2020  Next office visit: Visit date not found   Preferred Pharmacy: [facility]    Please call patient to clarify. Thank You.

## 2020-11-13 NOTE — OP NOTE
OPERATIVE NOTE        Date of Service: 11/13/20     Pre-operative Diagnosis: AUB  UTERINE FIBROIDS    Post-operative Diagnosis:  Same    Procedure: Procedure(s):  DILATATION AND CURETTAGE HYSTEROSCOPY W/ Emily Loera    Anesthesia: General    Surgeon: Jerel Houser MD    Assistants: Sc[employer] Person [employer]: Tino Mejia; Shari Briggs Word    Procedure Note:  After informed consents were obtained, the patient was taken to the operating  room where she was placed in the dorsal supine position. After adequate  anesthesia, she was placed in the dorsal lithotomy position, prepped and  draped in the usual sterile fashion for a vaginal procedure. Exam under anesthesia was unremarkable. Retractor was  then placed in the anterior and posterior wall of the vagina and the anterior lip  of the cervix was grasped with a single-tooth tenaculum. The cervix was  serially dilated and the hysteroscope was inserted. Hysteroscopy revealed endometrial cavity normal in contour without mass or polyp. Following hysteroscopy, sharp curettage was performed. This tissue was sent  to pathology for analysis. Following this, the NovaSure endometrial ablation device was deployed within  the cavity according to the predetermined measurements of cavity length 4.0cm and width of 3.3cm. The cavity test was passed and the ablation was carried out. It  takes approximately 90 seconds to complete. Following the NovaSure  endometrial ablation, hysteroscopy revealed the post-ablated endometrium  and the procedure was terminated. The tenaculum was removed from the anterior lip of the cervix and all the  retractors were removed from the vagina. Good hemostasis was observed. The patient tolerated the procedure well. There were no complications. She  was awakened from anesthesia, transferred to the recovery room in stable  condition. Sponge lap and needle counts were correct x2.         Estimated Blood Loss: 10 (units unknown)    Complications: None    Specimens: ID Type Source Tests Collected by Time Destination   A : Endometrial tissue Tissue Uterus SURGICAL PATHOLOGY Moy Schmitt MD 11/13/2020 [address] Joaquin Tobias MD  11/13/20  11:20 AM

## 2021-06-09 ENCOUNTER — TELEPHONE (OUTPATIENT)
Dept: OBGYN CLINIC | Age: 51
End: 2021-06-09

## 2021-06-09 RX ORDER — METRONIDAZOLE 500 MG/1
500 TABLET ORAL 2 TIMES DAILY
Qty: 14 TABLET | Refills: 0 | Status: SHIPPED | OUTPATIENT
Start: 2021-06-09 | End: 2021-06-16

## 2022-09-05 NOTE — PATIENT INSTRUCTIONS
Patient Education        Well Visit, Ages 25 to 48: Care Instructions  Overview     Well visits can help you stay healthy. Your doctor has checked your overall health and may have suggested ways to take good care of yourself. Your doctor also may have recommended tests. At home, you can help prevent illness withhealthy eating, regular exercise, and other steps. Follow-up care is a key part of your treatment and safety. Be sure to make and go to all appointments, and call your doctor if you are having problems. It's also a good idea to know your test results and keep alist of the medicines you take. How can you care for yourself at home? Get screening tests that you and your doctor decide on. Screening helps find diseases before any symptoms appear. Eat healthy foods. Choose fruits, vegetables, whole grains, protein, and low-fat dairy foods. Limit fat, especially saturated fat. Reduce salt in your diet. Limit alcohol. If you are a man, have no more than 2 drinks a day or 14 drinks a week. If you are a woman, have no more than 1 drink a day or 7 drinks a week. Get at least 30 minutes of physical activity on most days of the week. Walking is a good choice. You also may want to do other activities, such as running, swimming, cycling, or playing tennis or team sports. Discuss any changes in your exercise program with your doctor. Reach and stay at a healthy weight. This will lower your risk for many problems, such as obesity, diabetes, heart disease, and high blood pressure. Do not smoke or allow others to smoke around you. If you need help quitting, talk to your doctor about stop-smoking programs and medicines. These can increase your chances of quitting for good. Care for your mental health. It is easy to get weighed down by worry and stress. Learn strategies to manage stress, like deep breathing and mindfulness, and stay connected with your family and community.  If you find you often feel sad or hopeless, about a medical condition or this instruction, always ask your healthcare professional. [address] any warranty or liability for your use of this information. Patient Education        Pelvic Exam: Care Instructions  Overview     When your doctor examines your pelvic organs, it's called a pelvic exam. This exam is done to evaluate symptoms, such as pelvic pain or abnormal vaginal bleeding and discharge. It may also be done to collect samples of cells forcervical cancer screening. Before your exam, it's important to share some information with your doctor. You can talk about any concerns you may have. Your doctor will also want to know if you are pregnant or use birth control. And your doctor will want to hear about any problems, surgeries, or procedures you have had in your pelvicarea. You will also need to tell your doctor when your last period was. Follow-up care is a key part of your treatment and safety. Be sure to make and go to all appointments, and call your doctor if you are having problems. It's also a good idea to know your test results and keep alist of the medicines you take. How is a pelvic exam done? During a pelvic exam, you will: Take off your clothes below the waist. You will get a paper or cloth cover to put over the lower half of your body. Lie on your back on an exam table with your feet and legs supported by footrests. The doctor may:  Ask you to relax your knees. Your knees need to lean out, toward the walls. Check the opening of your vagina for sores or swelling. Gently put a tool called a speculum into your vagina. It opens the vagina a little bit. You may feel some pressure. The speculum lets your doctor see inside the vagina. Use a small brush, spatula, or swab to get a sample for testing. The doctor then removes the speculum. Put on gloves and put one or two fingers of one hand into your vagina. The other hand goes on your lower belly.  This lets your doctor feel your pelvic organs. You will probably feel some pressure. Put one gloved finger into your rectum and one into your vagina, if needed. This can also help check your pelvic organs. You may have a small amount of vaginal discharge or bleeding after the exam.  Why is a pelvic exam done? A pelvic exam may be done: To collect samples of cells for cervical cancer screening. To check for vaginal infection. To check for sexually transmitted infections, such as chlamydia or herpes. To help find the cause of abnormal uterine bleeding. To look for problems like uterine fibroids, ovarian cysts, or uterine prolapse. To help find the cause of pelvic or belly pain. Before inserting an intrauterine device (IUD). To collect evidence if you've been sexually assaulted. What are the risks of a pelvic exam?  There is a small chance that the doctor will find something on a pelvic exam that would not have caused a problem. This is called overdiagnosis. It couldlead to tests or treatment you don't need. When should you call for help? Watch closely for changes in your health, and be sure to contact your doctor if you have any problems. Where can you learn more? Go to https://Family Housing InvestmentspeRabbit.eWellness Corporation. org and sign in to your Private Company account. Enter F857 in the OPHTHONIX box to learn more about \"Pelvic Exam: Care Instructions. \"     If you do not have an account, please click on the \"Sign Up Now\" link. Current as of: November 22, 2021               Content Version: 13.3  © 3659-1212 TxCell. Care instructions adapted under license by [facility] [employer] [facility] ([facility]). If you have questions about a medical condition or this instruction, always ask your healthcare professional. [place] [address] any warranty or liability for your use of this information.        Patient Education        Breast Self-Exam: Care Instructions  Your Care Instructions     A breast self-exam is when you check your breasts for lumps or changes. This regular exam helps you learn how your breasts normally look and feel. Mostbreast problems or changes are not because of cancer. Breast self-exam is not a substitute for a mammogram. Having regular breast exams by your doctor and regular mammograms improve your chances of finding anyproblems with your breasts. Some women set a time each month to do a step-by-step breast self-exam. Other women like a less formal system. They might look at their breasts as they brushtheir teeth, or feel their breasts once in a while in the shower. If you notice a change in your breast, tell your doctor. Follow-up care is a key part of your treatment and safety. Be sure to make and go to all appointments, and call your doctor if you are having problems. It's also a good idea to know your test results and keep alist of the medicines you take. How do you do a breast self-exam?  The best time to examine your breasts is usually one week after your menstrual period begins. Your breasts should not be tender then. If you do not have periods, you might do your exam on a day of the month that is easy to remember. To examine your breasts:  Remove all your clothes above the waist and lie down. When you are lying down, your breast tissue spreads evenly over your chest wall, which makes it easier to feel all your breast tissue. Use the pads--not the fingertips--of the 3 middle fingers of your left hand to check your right breast. Move your fingers slowly in small coin-sized circles that overlap. Use three levels of pressure to feel of all your breast tissue. Use light pressure to feel the tissue close to the skin surface. Use medium pressure to feel a little deeper. Use firm pressure to feel your tissue close to your breastbone and ribs. Use each pressure level to feel your breast tissue before moving on to the next spot.   Check your entire breast, moving up and down as if following a strip from the collarbone to the bra line, and from the armpit to the ribs. Repeat until you have covered the entire breast.  Repeat this procedure for your left breast, using the pads of the 3 middle fingers of your right hand. To examine your breasts while in the shower:  Place one arm over your head and lightly soap your breast on that side. Using the pads of your fingers, gently move your hand over your breast (in the strip pattern described above), feeling carefully for any lumps or changes. Repeat for the other breast.  Have your doctor inspect anything you notice to see if you need further testing. Where can you learn more? Go to https://HipscanpeFetchBackeb.QuanTemplate. org and sign in to your HistoPathway account. Enter P148 in the Pileus Software box to learn more about \"Breast Self-Exam: Care Instructions. \"     If you do not have an account, please click on the \"Sign Up Now\" link. Current as of: September 8, 2021               Content Version: 13.3  © 2006-2022 Clickability. Care instructions adapted under license by [facility] ([facility]). If you have questions about a medical condition or this instruction, always ask your healthcare professional. [place] [address] any warranty or liability for your use of this information. Patient Education        Mammogram: About This Test  What is it? A mammogram is an X-ray of the breast that is used to screen for breast cancer. This test can find tumors that are too small for you or your doctor to feel. Cancer is most easily treated when it is found at an early stage. Why is this test done? A mammogram is done to:  Look for breast cancer when there are no symptoms. Find breast cancer when there are symptoms.  Symptoms of breast cancer may include a lump or thickening in the breast, nipple discharge, or dimpling of the skin on one area of the breast.  Find an area of suspicious breast tissue to remove for an exam under a microscope (biopsy). How do you prepare for the test?  If you've had a mammogram before at another clinic, have the results sent orbring them with you to your appointment. On the day of the mammogram, don't use any deodorant. And don't use perfume, powders, or ointments near or on your breasts. The residue left on your skin bythese substances may interfere with the X-rays. How is the test done? You will need to take off any jewelry that might interfere with the X-ray pictures. You will need to take off your clothes above the waist.  You will be given a cloth or paper gown to use during the test.  You probably will stand during the mammogram.  One at a time, your breasts will be placed on a flat plate. Another plate is then pressed firmly against your breast to help flatten out the breast tissue. You may be asked to lift your arm. For a few seconds while the X-ray picture is being taken, you will need to hold your breath. At least two pictures are taken of each breast. One is taken from the top and one from the side. How does having a mammogram feel? A mammogram is often uncomfortable but rarely painful. If you have sensitive or fragile skin or a skin condition, let the technician know before you have your exam. If you have menstrual periods, the procedure is more comfortable whendone within 2 weeks after your period has ended. Having your breasts flattened is usually uncomfortable, but it helps thetechnician get the best images. How long does the test take? The test will take about 10 to 15 minutes. You may be in the clinic for up to an hour. You may be asked to wait a few minutes while the images are checked to make sure they don't need to be redone. What happens after the test?  You will probably be able to go home right away. You can go back to your usual activities right away. Follow-up care is a key part of your treatment and safety.  Be sure to make and go to all appointments, and call your doctor if you are having problems. It's also a good idea to keep a list of the medicines youtake. Ask your doctor when you can expect to have your test results. Where can you learn more? Go to https://akbar.Impact. org and sign in to your Joshfire account. Enter V721 in the [facility] box to learn more about \"Mammogram: About This Test.\"     If you do not have an account, please click on the \"Sign Up Now\" link. Current as of: September 8, 2021               Content Version: 13.3  © 2006-2022 Worlds. Care instructions adapted under license by [facility] ([facility]). If you have questions about a medical condition or this instruction, always ask your healthcare professional. [address] any warranty or liability for your use of this information. Patient Education        Breast Cancer (BRCA) Gene Testing: Care Instructions  Overview     BRCA1 and BRCA2 are genes that help control normal cell growth. Sometimes, people inherit changes in one of these genes. These changes are called mutations. If you inherit a mutation in a BRCA (say \"BRAH-kuh\") gene, you have a greater risk of breast and ovarian cancers as well as some other cancers,such as prostate and pancreatic cancers. BRCA gene changes aren't common. If you are concerned that you may have a BRCA gene change, talk with your doctor. You can have genetic testing to find out if you have the BRCA mutation. A test may look just for BRCA gene changes. Or you may have a multigene panel test that also looks for other genes that can raiseyour cancer risk. Follow-up care is a key part of your treatment and safety. Be sure to make and go to all appointments, and call your doctor if you are having problems. It's also a good idea to know your test results and keep alist of the medicines you take. Why is the test done? A BRCA blood test is done to learn if you have BRCA gene changes.  You may feel better if the test shows that you don't have a BRCA mutation. If the test shows that you do have a BRCA mutation, you may be able to make some decisions thatcould reduce your cancer risk. What happens after a breast cancer gene (BRCA) test?  The results of a BRCA gene test can help you find out how high your cancer risk is. If it is high, you might decide to take steps to lower your risk. There areseveral things you might do, such as:  Have checkups and tests more often. Have surgery to remove your breasts. Have surgery to remove your ovaries. Take medicines that may help prevent breast cancer. What are the risks of the test?  A negative test may give you a false sense of security. So you may not have the regular tests that help find cancer at an early stage. But a negative BRCA testdoes not mean that you will never have breast or ovarian cancer. A positive test result may cause anxiety or depression. A positive BRCA testdoes not mean that you will definitely get breast or ovarian cancer. What can you do to reduce the risk of breast cancer? Your risk for breast cancer increases as you get older. There is no known way to prevent breast cancer. But with some cancers, finding them early canincrease your chances of successful treatment. Here are some steps you can take to help reduce your risk:  Get familiar with the look and feel of your breasts. This will help you notice any changes. Call your doctor if you notice a change. Have regular breast exams by your doctor or nurse. Ask your doctor how often you should get them. Have regular mammograms. A mammogram is a picture of your breast tissue. It can find changes in your breast before you can feel them. Talk to your doctor about when to get this test.  You can also help take care of yourself and reduce your risk of cancer if you:  Stay at a healthy weight. Eat a healthy, low-fat diet. Get some exercise every day. If you don't usually exercise, walking is a good way to start.   Don't smoke. If you need help quitting, talk to your doctor about stop-smoking programs and medicines. These can increase your chances of quitting for good. If you drink alcohol, limit how much you drink. Any amount of alcohol may increase your risk for some types of cancer. Breastfeed. There is some evidence that breastfeeding may lower the risk of breast cancer. The benefit seems to be greatest in those who have  for longer than 12 months or who  several children. BRCA gene changes  People who do a gene test and find out that they have a BRCA gene change havesome options to manage their cancer risk. For female breast cancer: If you haven't yet had cancer, you may want to think about starting breast cancer screening at a younger age, taking medicine, and having preventive surgery. For male breast cancer: You may want to think about doing breast self-exams and having clinical breast exams. (And it's a good idea to have prostate cancer screenings too.)  If you have a BRCA gene change, talk with your doctor about how you can manageyour cancer risk. Where can you learn more? Go to https://eBoox.XSteach.com. org and sign in to your Flixwagon account. Enter U252 in the [facility] box to learn more about \"Breast Cancer (BRCA) Gene Testing: Care Instructions. \"     If you do not have an account, please click on the \"Sign Up Now\" link. Current as of: September 8, 2021               Content Version: 13.3  © 2006-2022 GrowYo. Care instructions adapted under license by [facility] ([facility]). If you have questions about a medical condition or this instruction, always ask your healthcare professional. [place] [address] any warranty or liability for your use of this information.        Patient Education        A Healthy Lifestyle: Care Instructions  Your Care Instructions     A healthy lifestyle can help you feel good, stay at a healthy weight, and have plenty of energy for both work and play. A healthy lifestyle is something youcan share with your whole family. A healthy lifestyle also can lower your risk for serious health problems, suchas high blood pressure, heart disease, and diabetes. You can follow a few steps listed below to improve your health and the healthof your family. Follow-up care is a key part of your treatment and safety. Be sure to make and go to all appointments, and call your doctor if you are having problems. It's also a good idea to know your test results and keep alist of the medicines you take. How can you care for yourself at home? Do not eat too much sugar, fat, or fast foods. You can still have dessert and treats now and then. The goal is moderation. Start small to improve your eating habits. Pay attention to portion sizes, drink less juice and soda pop, and eat more fruits and vegetables. Eat a healthy amount of food. A 3-ounce serving of meat, for example, is about the size of a deck of cards. Fill the rest of your plate with vegetables and whole grains. Limit the amount of soda and sports drinks you have every day. Drink more water when you are thirsty. Eat plenty of fruits and vegetables every day. Have an apple or some carrot sticks as an afternoon snack instead of a candy bar. Try to have fruits and/or vegetables at every meal.  Make exercise part of your daily routine. You may want to start with simple activities, such as walking, bicycling, or slow swimming. Try to be active 30 to 60 minutes every day. You do not need to do all 30 to 60 minutes all at once. For example, you can exercise 3 times a day for 10 or 20 minutes. Moderate exercise is safe for most people, but it is always a good idea to talk to your doctor before starting an exercise program.  Keep moving. [place] the lawn, work in the garden, or [employer]. Take the stairs instead of the elevator at work. If you smoke, quit.  People who smoke have an increased risk for heart attack, stroke, cancer, and other lung illnesses. Quitting is hard, but there are ways to boost your chance of quitting tobacco for good. Use nicotine gum, patches, or lozenges. Ask your doctor about stop-smoking programs and medicines. Keep trying. In addition to reducing your risk of diseases in the future, you will notice some benefits soon after you stop using tobacco. If you have shortness of breath or asthma symptoms, they will likely get better within a few weeks after you quit. Limit how much alcohol you drink. Moderate amounts of alcohol (up to 2 drinks a day for men, 1 drink a day for women) are okay. But drinking too much can lead to liver problems, high blood pressure, and other health problems. Family health  If you have a family, there are many things you can do together to improve yourhealth. Eat meals together as a family as often as possible. Eat healthy foods. This includes fruits, vegetables, lean meats and dairy, and whole grains. Include your family in your fitness plan. Most people think of activities such as jogging or tennis as the way to fitness, but there are many ways you and your family can be more active. Anything that makes you breathe hard and gets your heart pumping is exercise. Here are some tips:  Walk to do errands or to take your child to school or the bus. Go for a family bike ride after dinner instead of watching TV. Where can you learn more? Go to https://Iceni TechnologyloegarioLantos Technologies.healthTarget Data. org and sign in to your LetGive account. Enter R856 in the [facility] box to learn more about \"A Healthy Lifestyle: Care Instructions. \"     If you do not have an account, please click on the \"Sign Up Now\" link. Current as of: February 9, 2022               Content Version: 13.3  © 0405-4476 Healthwise, Travergence. Care instructions adapted under license by [facility] ([facility]).  If you have questions about a medical condition or this instruction, always ask your healthcare professional. [address] any warranty or liability for your use of this information.

## 2022-09-06 ENCOUNTER — OFFICE VISIT (OUTPATIENT)
Dept: OBGYN CLINIC | Age: 52
End: 2022-09-06

## 2022-09-06 VITALS
SYSTOLIC BLOOD PRESSURE: 128 MMHG | BODY MASS INDEX: 19.16 KG/M2 | WEIGHT: 115 LBS | DIASTOLIC BLOOD PRESSURE: 85 MMHG | HEIGHT: 65 IN | HEART RATE: 88 BPM

## 2022-09-06 DIAGNOSIS — N92.0 SPOTTING: ICD-10-CM

## 2022-09-06 DIAGNOSIS — Z20.2 EXPOSURE TO SEXUALLY TRANSMITTED DISEASE (STD): ICD-10-CM

## 2022-09-06 DIAGNOSIS — Z11.51 SCREENING FOR HPV (HUMAN PAPILLOMAVIRUS): ICD-10-CM

## 2022-09-06 DIAGNOSIS — Z12.4 SCREENING FOR CERVICAL CANCER: ICD-10-CM

## 2022-09-06 DIAGNOSIS — Z12.31 ENCOUNTER FOR SCREENING MAMMOGRAM FOR MALIGNANT NEOPLASM OF BREAST: ICD-10-CM

## 2022-09-06 DIAGNOSIS — Z98.82 BREAST IMPLANT STATUS: ICD-10-CM

## 2022-09-06 DIAGNOSIS — Z01.419 ENCOUNTER FOR WELL WOMAN EXAM WITH ROUTINE GYNECOLOGICAL EXAM: Primary | ICD-10-CM

## 2022-09-06 ASSESSMENT — ENCOUNTER SYMPTOMS
GASTROINTESTINAL NEGATIVE: 1
EYES NEGATIVE: 1
ALLERGIC/IMMUNOLOGIC NEGATIVE: 1
RESPIRATORY NEGATIVE: 1

## 2022-09-06 NOTE — PROGRESS NOTES
[facility] OB[facility]  CNM Office Note    Adalberto Sosa is a 50 y.o. female who presents today for her medical conditions/ complaints as noted below. Chief Complaint   Patient presents with    Gynecologic Exam     Patient presents today for a pap smear and breast exam.    Other     Back in , she had the ablation done and no problems, but a couple days ago, she went to [place] and noticed brown discharge when she wiped, like the end of her period. But no period since the ablation. Is that normal?? Has also had a few BV infections; normal after ablation or ? For a year, she would have one every month. Around the time of her period. Hasn't had one in a while but didn't know what that was from. Cheating spouse? Tested for STD's? Cause regular BV? HPI  Von Doherty presents for annual exam. She noticed 2 days ago she had some vaginal spotting - brown discharge. Has not had period since ablation 2 years ago. Due for mammogram but nervous to have due to breast implants. Patient Active Problem List   Diagnosis    Abnormal uterine bleeding (AUB)       Patient's last menstrual period was 10/30/2020 (exact date). [id number]    History reviewed. No pertinent past medical history.   Past Surgical History:   Procedure Laterality Date    BREAST ENHANCEMENT SURGERY  2019    CERVICAL CERCLAGE      x4; was put in during every pregnancy then take it out; due to stage 4 dysplagia     SECTION      x2    DILATION AND CURETTAGE OF UTERUS      due to stage 4 dysplagia    DILATION AND CURETTAGE OF UTERUS N/A 2020    DILATATION AND CURETTAGE HYSTEROSCOPY W/ Pepe Esters performed by Sharif Davidson MD at [address] [place] [address]      Dr. Jethro Navarro     Family History   Problem Relation Age of Onset    Other Mother         IRREGULAR HEART RATE    Stroke Father      Social History     Tobacco Use    Smoking status: Every Day     Packs/day: 0.50     Years: 20.00     Pack years: 10.00     Types: Cigarettes    Smokeless tobacco: Never   Substance Use Topics    Alcohol use: Yes     Comment: rarely       Current Outpatient Medications   Medication Sig Dispense Refill    ibuprofen (ADVIL;MOTRIN) 800 MG tablet Take 1 tablet by mouth 3 times daily (with meals) (Patient not taking: Reported on 9/6/2022) 30 tablet 0     No current facility-administered medications for this visit. No Known Allergies  Vitals:    09/06/22 1457   BP: 128/85   Site: Left Upper Arm   Position: Sitting   Cuff Size: Medium Adult   Pulse: 88   Weight: 115 lb (52.2 kg)   Height: 5' 5\" (1.651 m)     Body mass index is 19.14 kg/m². Review of Systems   Constitutional: Negative. HENT: Negative. Eyes: Negative. Respiratory: Negative. Cardiovascular: Negative. Gastrointestinal: Negative. Endocrine: Negative. Genitourinary:  Positive for vaginal bleeding (spotting, dark brown). Musculoskeletal: Negative. Skin: Negative. Allergic/Immunologic: Negative. Neurological: Negative. Hematological: Negative. Psychiatric/Behavioral: Negative. Physical Exam  Constitutional:       Appearance: She is well-developed. HENT:      Head: Normocephalic and atraumatic. Eyes:      Conjunctiva/sclera: Conjunctivae normal.      Pupils: Pupils are equal, round, and reactive to light. Neck:      Thyroid: No thyromegaly. Trachea: No tracheal deviation. Cardiovascular:      Rate and Rhythm: Normal rate and regular rhythm. Heart sounds: Normal heart sounds. No murmur heard. Pulmonary:      Effort: Pulmonary effort is normal. No respiratory distress. Breath sounds: Normal breath sounds. Chest:      Comments: Breasts symmetrical without tenderness, masses, or nipple discharge. Nipples everted bilaterally. Abdominal:      General: There is no distension. Palpations: Abdomen is soft. Tenderness: There is no abdominal tenderness. There is no guarding. Genitourinary:     General: Normal vulva.       Exam position: Lithotomy position. Labia:         Right: No rash or lesion. Left: No rash or lesion. Vagina: Normal. No erythema or lesions. Cervix: Cervical bleeding (dark spotting) present. Uterus: Normal. Not tender. Adnexa: Right adnexa normal and left adnexa normal.        Right: No mass, tenderness or fullness. Left: No mass, tenderness or fullness. Musculoskeletal:         General: Normal range of motion. Cervical back: Normal range of motion and neck supple. Skin:     General: Skin is warm and dry. Capillary Refill: Capillary refill takes less than 2 seconds. Neurological:      Mental Status: She is alert and oriented to person, place, and time. Psychiatric:         Behavior: Behavior normal.         Thought Content: Thought content normal.         Judgment: Judgment normal.        Diagnosis Orders   1. Encounter for well woman exam with routine gynecological exam  PAP SMEAR      2. Encounter for screening mammogram for malignant neoplasm of breast  NAVJOT DIGITAL SCREEN W OR WO CAD BILATERAL      3. Screening for cervical cancer  PAP SMEAR      4. Screening for HPV (human papillomavirus)  Human papillomavirus (HPV) DNA probe thin prep high risk      5. Spotting  US NON OB TRANSVAGINAL    C.trachomatis, N.gonorrhoeae, T.vaginalis Molecular, Thin Prep      6. Exposure to sexually transmitted disease (STD)  C.trachomatis, N.gonorrhoeae, T.vaginalis Molecular, Thin Prep      7. Breast implant status            MEDICATIONS:  No orders of the defined types were placed in this encounter. ORDERS:  Orders Placed This Encounter   Procedures    C.trachomatis, N.gonorrhoeae, T.vaginalis Molecular, Thin Prep    NAVJOT DIGITAL SCREEN W OR WO CAD BILATERAL    US NON OB TRANSVAGINAL    PAP SMEAR    Human papillomavirus (HPV) DNA probe thin prep high risk       PLAN:  WWE - PAP w HPV and STD screen per pt request. Mammogram ordered. Vaginal spotting - u/s ordered.

## 2022-09-06 NOTE — PROGRESS NOTES
Pt presents today for pap smear and breast exam.    Last mammogram: 2019 @ [facility]-she has breast implants and is terrified it will rupture them and no hx of breast cancer  Last pap smear: 10-, negative pap and +HPV (low risk)   Sexually active: Yes  Sexual preference: Male  Contraception: TL and ablation  : 4  Para: 4  AB: 0  Last bone density: never   Last colonoscopy: never  Menarche: 17years old  LMP: 10-  Menses: no spotting or bleeding since ablation done in , [place] UNTIL a couple days ago, she noticed brown when she wiped.

## 2022-09-08 LAB
C. TRACHOMATIS DNA,THIN PREP: NOT DETECTED
N. GONORRHOEAE DNA, THIN PREP: NOT DETECTED
TRICHOMONAS VAGINALIS DNA: NOT DETECTED

## 2022-09-20 ENCOUNTER — TELEPHONE (OUTPATIENT)
Dept: OBGYN CLINIC | Age: 52
End: 2022-09-20

## 2022-10-25 ENCOUNTER — PROCEDURE VISIT (OUTPATIENT)
Dept: OBGYN CLINIC | Age: 52
End: 2022-10-25
Payer: COMMERCIAL

## 2022-10-25 VITALS
HEIGHT: 65 IN | DIASTOLIC BLOOD PRESSURE: 70 MMHG | BODY MASS INDEX: 19.83 KG/M2 | WEIGHT: 119 LBS | SYSTOLIC BLOOD PRESSURE: 102 MMHG

## 2022-10-25 DIAGNOSIS — Z01.812 PRE-PROCEDURE LAB EXAM: ICD-10-CM

## 2022-10-25 DIAGNOSIS — R87.610 ASCUS OF CERVIX WITH NEGATIVE HIGH RISK HPV: Primary | ICD-10-CM

## 2022-10-25 DIAGNOSIS — R87.820 CERVICAL LOW RISK HUMAN PAPILLOMAVIRUS (HPV) DNA TEST POSITIVE: ICD-10-CM

## 2022-10-25 LAB
CONTROL: PRESENT
PREGNANCY TEST URINE, POC: NEGATIVE

## 2022-10-25 PROCEDURE — 57454 BX/CURETT OF CERVIX W/SCOPE: CPT | Performed by: ADVANCED PRACTICE MIDWIFE

## 2022-10-25 PROCEDURE — 81025 URINE PREGNANCY TEST: CPT | Performed by: ADVANCED PRACTICE MIDWIFE

## 2022-10-25 ASSESSMENT — ENCOUNTER SYMPTOMS
RESPIRATORY NEGATIVE: 1
EYES NEGATIVE: 1
GASTROINTESTINAL NEGATIVE: 1
ALLERGIC/IMMUNOLOGIC NEGATIVE: 1

## 2022-10-25 NOTE — PATIENT INSTRUCTIONS
Patient Education        Abnormal Pap Test: Care Instructions  Overview     A Pap test (also called a Pap smear) is used to look for early changes that may become cancer of the cervix. Your Pap test was abnormal. That may mean that some cells in your cervix have changed. The cell changes are most often caused by human papillomavirus (HPV) infection. An abnormal Pap test does not mean that the abnormal cells will lead to cancer. Changes in cervical cells may go away on their own or may progress slowly. Your doctor may have you follow a schedule of regular cervical cancer screening tests. Or your doctor may have you take other tests to find out more about your cell changes. It is very important that you have regular cervical cancer screening tests after you've had an abnormal Pap test.  Follow-up care is a key part of your treatment and safety. Be sure to make and go to all appointments, and call your doctor if you are having problems. It's also a good idea to know your test results and keep a list of the medicines you take. How can you care for yourself at home? Do not smoke. Smoking may increase your risk for cervical cell changes. If you need help quitting, talk to your doctor about stop-smoking programs and medicines. These can increase your chances of quitting for good. Be sure to get follow-up Pap tests or other follow-up tests as recommended by your doctor. When should you call for help? Watch closely for changes in your health, and be sure to contact your doctor if you have any problems. Where can you learn more? Go to https://Ib[employer] Outdoor Clothingmaikel.Rain. org and sign in to your [employer] account. Enter P925 in the [employer] ([employer]) box to learn more about \"Abnormal Pap Test: Care Instructions. \"     If you do not have an account, please click on the \"Sign Up Now\" link. Current as of: November 22, 2021               Content Version: 13.4  © 8507-8908 Healthwise, [employer].    Care instructions adapted under license by [facility] ([facility]). If you have questions about a medical condition or this instruction, always ask your healthcare professional. [address] any warranty or liability for your use of this information. Patient Education        Colposcopy: What to Expect at [address] may feel some soreness in your vagina for a day or two if you had a biopsy. Some vaginal bleeding or discharge is normal for up to a week after a biopsy. The discharge may be dark-colored if a solution was put on your cervix. You can use a sanitary pad for the bleeding. It may take a week or two for you to get the test results. This care sheet gives you a general idea about how long it will take for you to recover. But each person recovers at a different pace. Follow the steps below to feel better as quickly as possible. How can you care for yourself at home? Activity    You can return to work and most daily activities right after the test.   Exercise    Do not exercise for 1 day after the test.   Medicines    Your doctor will tell you if and when you can restart your medicines. You will also get instructions about taking any new medicines. If you stopped taking aspirin or some other blood thinner, your doctor will tell you when to start taking it again. Take an over-the-counter pain medicine, such as acetaminophen (Tylenol), ibuprofen (Advil, Motrin), or naproxen (Aleve). Be safe with medicines. Read and follow all instructions on the label. Do not take two or more pain medicines at the same time unless the doctor told you to. Many pain medicines have acetaminophen, which is Tylenol. Too much acetaminophen (Tylenol) can be harmful. Other instructions    Some vaginal bleeding or discharge is normal for up to a week after a biopsy. The discharge may be dark-colored. Use a pad if you have some bleeding.      Do not douche, have sexual intercourse, or use tampons for 1 week if you had a biopsy. This will allow time for your cervix to heal.     You can take a bath or shower anytime after the test.   Follow-up care is a key part of your treatment and safety. Be sure to make and go to all appointments, and call your doctor if you are having problems. It's also a good idea to know your test results and keep a list of the medicines you take. When should you call for help? Call your doctor now or seek immediate medical care if:    You have severe vaginal bleeding. This means that you are soaking through your usual pads or tampons each hour for 2 or more hours. You have pain that does not get better after you take pain medicine. You have signs of infection, such as: Increased pain. Bad-smelling vaginal discharge. A fever. Watch closely for any changes in your health, and be sure to contact your doctor if:    You have questions or concerns. Where can you learn more? Go to https://ClariPhy Communications.Professionals' Corner. org and sign in to your [employer] account. Enter M523 in the [employer] box to learn more about \"Colposcopy: What to Expect at Home. \"     If you do not have an account, please click on the \"Sign Up Now\" link. Current as of: November 22, 2021               Content Version: 13.4  © 2006-2022 Healthwise, Incorporated. Care instructions adapted under license by [facility] ([facility]). If you have questions about a medical condition or this instruction, always ask your healthcare professional. [place] [address] any warranty or liability for your use of this information. Patient Education        Breast Self-Exam: Care Instructions  Your Care Instructions     A breast self-exam is when you check your breasts for lumps or changes. This regular exam helps you learn how your breasts normally look and feel. Most breast problems or changes are not because of cancer.   Breast self-exam is not a substitute for a mammogram. Having regular breast exams by your doctor and regular mammograms improve your chances of finding any problems with your breasts. Some women set a time each month to do a step-by-step breast self-exam. Other women like a less formal system. They might look at their breasts as they brush their teeth, or feel their breasts once in a while in the shower. If you notice a change in your breast, tell your doctor. Follow-up care is a key part of your treatment and safety. Be sure to make and go to all appointments, and call your doctor if you are having problems. It's also a good idea to know your test results and keep a list of the medicines you take. How do you do a breast self-exam?  The best time to examine your breasts is usually one week after your menstrual period begins. Your breasts should not be tender then. If you do not have periods, you might do your exam on a day of the month that is easy to remember. To examine your breasts:  Remove all your clothes above the waist and lie down. When you are lying down, your breast tissue spreads evenly over your chest wall, which makes it easier to feel all your breast tissue. Use the pads--not the fingertips--of the 3 middle fingers of your left hand to check your right breast. Move your fingers slowly in small coin-sized circles that overlap. Use three levels of pressure to feel of all your breast tissue. Use light pressure to feel the tissue close to the skin surface. Use medium pressure to feel a little deeper. Use firm pressure to feel your tissue close to your breastbone and ribs. Use each pressure level to feel your breast tissue before moving on to the next spot. Check your entire breast, moving up and down as if following a strip from the collarbone to the bra line, and from the armpit to the ribs. Repeat until you have covered the entire breast.  Repeat this procedure for your left breast, using the pads of the 3 middle fingers of your right hand.   To examine your breasts while in the shower:  Place one arm over your head and lightly soap your breast on that side. Using the pads of your fingers, gently move your hand over your breast (in the strip pattern described above), feeling carefully for any lumps or changes. Repeat for the other breast.  Have your doctor inspect anything you notice to see if you need further testing. Where can you learn more? Go to https://Attachments.mepeWaysGo.[employer]. org and sign in to your [employer] account. Enter P148 in the Voicebase box to learn more about \"Breast Self-Exam: Care Instructions. \"     If you do not have an account, please click on the \"Sign Up Now\" link. Current as of: November 22, 2021               Content Version: 13.4  © 2006-2022 Healthwise, Incorporated. Care instructions adapted under license by [facility] ([facility]). If you have questions about a medical condition or this instruction, always ask your healthcare professional. [place] [address] any warranty or liability for your use of this information.

## 2022-10-25 NOTE — PROGRESS NOTES
Addended by: YULIYA PHELPS on: 1/5/2022 12:03 PM     Modules accepted: Orders     [facility]  CNM Office Note    Addie Vann is a 50 y.o. female who presents today for her medical conditions/ complaints as noted below. Chief Complaint   Patient presents with    Procedure     Colpo ASCUS + Other HPV         HPI  Obi Floris presents for colposcopy following abnormal PAP. Has had LEEP/CONE in the past.  Patient Active Problem List   Diagnosis    Abnormal uterine bleeding (AUB)       No LMP recorded. Patient has had an ablation. [id number]    History reviewed. No pertinent past medical history. Past Surgical History:   Procedure Laterality Date    BREAST ENHANCEMENT SURGERY  2019    CERVICAL CERCLAGE      x4; was put in during every pregnancy then take it out; due to stage 4 dysplagia     SECTION      x2    DILATION AND CURETTAGE OF UTERUS      due to stage 4 dysplagia    DILATION AND CURETTAGE OF UTERUS N/A 2020    DILATATION AND CURETTAGE HYSTEROSCOPY W/ Makeda Peers performed by Zaheer Schroeder MD at [address] [place] [address]    Dr. Brian Leigh     Family History   Problem Relation Age of Onset    Other Mother         IRREGULAR HEART RATE    Stroke Father      Social History     Tobacco Use    Smoking status: Every Day     Packs/day: 0.50     Years: 20.00     Pack years: 10.00     Types: Cigarettes    Smokeless tobacco: Never   Substance Use Topics    Alcohol use: Yes     Comment: rarely       Current Outpatient Medications   Medication Sig Dispense Refill    ibuprofen (ADVIL;MOTRIN) 800 MG tablet Take 1 tablet by mouth 3 times daily (with meals) (Patient not taking: Reported on 2022) 30 tablet 0     No current facility-administered medications for this visit. No Known Allergies  Vitals:    10/25/22 1517   BP: 102/70     Body mass index is 19.8 kg/m². Review of Systems   Constitutional: Negative. HENT: Negative. Eyes: Negative. Respiratory: Negative. Cardiovascular: Negative. Gastrointestinal: Negative. Endocrine: Negative. Genitourinary: Negative. Musculoskeletal: Negative. Skin: Negative. Allergic/Immunologic: Negative. Neurological: Negative. Hematological: Negative. Psychiatric/Behavioral: Negative. Physical Exam  Constitutional:       Appearance: Normal appearance. She is well-developed. She is not diaphoretic. HENT:      Head: Normocephalic and atraumatic. Nose: Nose normal.   Eyes:      Extraocular Movements: Extraocular movements intact. Conjunctiva/sclera: Conjunctivae normal.      Pupils: Pupils are equal, round, and reactive to light. Neck:      Thyroid: No thyromegaly. Trachea: No tracheal deviation. Pulmonary:      Effort: Pulmonary effort is normal. No respiratory distress. Musculoskeletal:         General: Normal range of motion. Cervical back: Normal range of motion and neck supple. Comments: Normal ROM for upper and lower extremities. Gait steady. Skin:     General: Skin is warm and dry. Findings: No lesion or rash. Neurological:      Mental Status: She is alert and oriented to person, place, and time. Psychiatric:         Mood and Affect: Mood normal.         Speech: Speech normal.         Behavior: Behavior normal.       Colposcopy Procedure Note    Indications: Pap smear 1 months ago showed: ASCUS with POSITIVE high risk HPV. The prior pap showed no abnormalities. Prior cervical/vaginal disease: HPV related changes with LEEP/CONE Prior cervical treatment: LLETZ and cone. Procedure Details   The risks and benefits of the procedure and Written informed consent obtained. Speculum placed in vagina and excellent visualization of cervix achieved, cervix swabbed x 3 with acetic acid solution. Findings:  Cervix: no visible lesions, no mosaicism, no punctation, and no abnormal vasculature; endocervical curettage performed. Vaginal inspection: vaginal colposcopy not performed. Vulvar colposcopy: vulvar colposcopy not performed.     Specimens: 1    Complications: none. Plan:  Specimens labelled and sent to Pathology. Repeat pap in 1 year   Diagnosis Orders   1. ASCUS of cervix with negative high risk HPV  OK COLPOSC,CERVIX W/ADJ VAG,W/BX & CURRETAG    Specimen to Pathology Outpatient      2. Cervical low risk human papillomavirus (HPV) DNA test positive  OK COLPOSC,CERVIX W/ADJ VAG,W/BX & CURRETAG    Specimen to Pathology Outpatient      3. Pre-procedure lab exam  POCT urine pregnancy          MEDICATIONS:  No orders of the defined types were placed in this encounter.       ORDERS:  Orders Placed This Encounter   Procedures    Specimen to Pathology Outpatient    POCT urine pregnancy    OK COLPOSC,CERVIX W/ADJ VAG,W/BX & CURRETAG

## 2024-12-06 NOTE — PATIENT INSTRUCTIONS

## 2024-12-11 ENCOUNTER — OFFICE VISIT (OUTPATIENT)
Dept: OBGYN CLINIC | Age: 54
End: 2024-12-11
Payer: COMMERCIAL

## 2024-12-11 VITALS
HEART RATE: 102 BPM | WEIGHT: 123 LBS | BODY MASS INDEX: 20.49 KG/M2 | HEIGHT: 65 IN | SYSTOLIC BLOOD PRESSURE: 121 MMHG | DIASTOLIC BLOOD PRESSURE: 82 MMHG

## 2024-12-11 DIAGNOSIS — Z01.419 WELL WOMAN EXAM WITH ROUTINE GYNECOLOGICAL EXAM: Primary | ICD-10-CM

## 2024-12-11 DIAGNOSIS — Z11.51 ENCOUNTER FOR SCREENING FOR HUMAN PAPILLOMAVIRUS (HPV): ICD-10-CM

## 2024-12-11 DIAGNOSIS — Z12.4 CERVICAL CANCER SCREENING: ICD-10-CM

## 2024-12-11 DIAGNOSIS — Z12.31 SCREENING MAMMOGRAM FOR BREAST CANCER: ICD-10-CM

## 2024-12-11 DIAGNOSIS — Z12.39 SCREENING BREAST EXAMINATION: ICD-10-CM

## 2024-12-11 PROCEDURE — 99396 PREV VISIT EST AGE 40-64: CPT | Performed by: ADVANCED PRACTICE MIDWIFE

## 2024-12-11 NOTE — PROGRESS NOTES
Pt presents today for pap smear and breast exam.      Last mammogram: never - \"I don't do them\"  Last pap smear: 2022 HPV other    Sexually active: Yes  Sexual preference: Male  Contraception:   : 4  Para: 4  AB: 0  Last bone density: never    Last colonoscopy: never

## 2024-12-11 NOTE — PROGRESS NOTES
[facility] OB/[facility]  CNM Office Note    Francine Tomlinson is a 54 y.o. female who presents today for her medical conditions/ complaints as noted below.  Chief Complaint   Patient presents with    Annual Exam       HPI  HPI   Francine presents for annual gyn exam. She is doing well without complaints. No sexual concerns. Menses altered with ablation.   Last mammogram: never - \"I don't do them\"  Last pap smear: 2022 HPV other    Sexually active: Yes  Sexual preference: Male  Contraception:   : 4  Para: 4  AB: 0  Last bone density: never    Last colonoscopy: never   Problems/Complaints today:  Patient Active Problem List   Diagnosis    Abnormal uterine bleeding (AUB)       No LMP recorded (lmp unknown). Patient has had an ablation.      History reviewed. No pertinent past medical history.  Past Surgical History:   Procedure Laterality Date    BREAST ENHANCEMENT SURGERY  2019    CERVICAL CERCLAGE      x4; was put in during every pregnancy then take it out; due to stage 4 dysplagia     SECTION      x2    DILATION AND CURETTAGE OF UTERUS      due to stage 4 dysplagia    DILATION AND CURETTAGE OF UTERUS N/A 2020    DILATATION AND CURETTAGE HYSTEROSCOPY W/ NUVASURE performed by Zeny Garay MD at [facility] OR    TUBAL LIGATION      Dr. Menjivar     Family History   Problem Relation Age of Onset    Other Mother         IRREGULAR HEART RATE    Stroke Father      Social History     Tobacco Use    Smoking status: Every Day     Current packs/day: 0.50     Average packs/day: 0.5 packs/day for 20.0 years (10.0 ttl pk-yrs)     Types: Cigarettes    Smokeless tobacco: Never   Substance Use Topics    Alcohol use: Yes     Comment: rarely       No current outpatient medications on file.     No current facility-administered medications for this visit.     No Known Allergies  Vitals:    24 1437   BP: 121/82   Pulse: (!) 102   Weight: 55.8 kg (123 lb)   Height: 1.651 m (5' 5\")     Body mass index

## 2024-12-14 LAB
HPV HR 12 DNA SPEC QL NAA+PROBE: DETECTED
HPV16 DNA SPEC QL NAA+PROBE: NOT DETECTED
HPV16+18+H RISK 12 DNA SPEC-IMP: ABNORMAL
HPV18 DNA SPEC QL NAA+PROBE: NOT DETECTED

## 2025-01-10 ENCOUNTER — TELEPHONE (OUTPATIENT)
Dept: OBGYN CLINIC | Age: 55
End: 2025-01-10

## 2025-01-10 NOTE — TELEPHONE ENCOUNTER
Left message for patient regarding results:    Per Shelby:  We got the results from your pap which came back slightly abnormal. I need to take a closer look with a microscope. This procedure is called a colposcopy. Please call the office to schedule

## 2025-01-23 NOTE — PATIENT INSTRUCTIONS
A Healthy Lifestyle: Care Instructions  A healthy lifestyle can help you feel good, have more energy, and stay at a weight that's healthy for you. You can share a healthy lifestyle with your friends and family. And you can do it on your own.    Eat meals with your friends or family. You could try cooking together.    Plan activities with other people. Go for a walk with a friend, try a free online fitness class, or join a sports league.    Eat a variety of healthy foods. These include fruits, vegetables, whole grains, low-fat dairy, and lean protein.    Choose healthy portions of food. You can use the Nutrition Facts label on food packages as a guide.    Eat more fruits and vegetables. You could add vegetables to sandwiches or add fruit to cereal.    Drink water when you are thirsty. Limit soda, juice, and sports drinks.    Try to exercise most days. Aim for at least 2½ hours of exercise each week.    Keep moving. Work in the garden or take your dog on a walk. Use the stairs instead of the elevator.    If you use tobacco or nicotine, try to quit. Ask your doctor about programs and medicines to help you quit.    Limit alcohol. Men should have no more than 2 drinks a day. Women should have no more than 1. For some people, no alcohol is the best choice.  Follow-up care is a key part of your treatment and safety. Be sure to make and go to all appointments, and call your doctor if you are having problems. It's also a good idea to know your test results and keep a list of the medicines you take.  Where can you learn more?  Go to https://www.[employer].net/patientEd and enter U807 to learn more about \"A Healthy Lifestyle: Care Instructions.\"  Current as of: August 6, 2023               Content Version: 14.0  © 3058-2867 [employer]wise[employer].   Care instructions adapted under license by [employer]. If you have questions about a medical condition or this instruction, always ask your healthcare professional.

## 2025-01-27 ENCOUNTER — PROCEDURE VISIT (OUTPATIENT)
Dept: OBGYN CLINIC | Age: 55
End: 2025-01-27

## 2025-01-27 VITALS
BODY MASS INDEX: 20.99 KG/M2 | HEART RATE: 88 BPM | WEIGHT: 126 LBS | SYSTOLIC BLOOD PRESSURE: 121 MMHG | DIASTOLIC BLOOD PRESSURE: 85 MMHG | HEIGHT: 65 IN

## 2025-01-27 DIAGNOSIS — R87.612 LOW GRADE SQUAMOUS INTRAEPITHELIAL LESION ON CYTOLOGIC SMEAR OF CERVIX (LGSIL): ICD-10-CM

## 2025-01-27 DIAGNOSIS — Z87.42 HX OF ABNORMAL CERVICAL PAP SMEAR: ICD-10-CM

## 2025-01-27 DIAGNOSIS — R87.820 CERVICAL LOW RISK HUMAN PAPILLOMAVIRUS (HPV) DNA TEST POSITIVE: Primary | ICD-10-CM

## 2025-01-27 NOTE — PROGRESS NOTES
[facility] OB/GYN  CNM Office Note    Francine Tomlinson is a 55 y.o. female who presents today for her medical conditions/ complaints as noted below.  Chief Complaint   Patient presents with    Procedure         Francine presents for a problem focused visit. Her pap smear in  was ASCUS +Other HPV, colposcopy was benign. Has had history of LEEP. Pap smear in December showed LGSIL +Other HPV        Patient Active Problem List   Diagnosis    Abnormal uterine bleeding (AUB)       No LMP recorded. Patient has had an ablation.      History reviewed. No pertinent past medical history.  Past Surgical History:   Procedure Laterality Date    BREAST ENHANCEMENT SURGERY  2019    CERVICAL CERCLAGE      x4; was put in during every pregnancy then take it out; due to stage 4 dysplagia     SECTION      x2    DILATION AND CURETTAGE OF UTERUS      due to stage 4 dysplagia    DILATION AND CURETTAGE OF UTERUS N/A 2020    DILATATION AND CURETTAGE HYSTEROSCOPY W/ NUVASURE performed by Zeny Garay MD at [facility] OR    TUBAL LIGATION      Dr. Menjivar     Family History   Problem Relation Age of Onset    Other Mother         IRREGULAR HEART RATE    Stroke Father      Social History     Tobacco Use    Smoking status: Every Day     Current packs/day: 0.50     Average packs/day: 0.5 packs/day for 20.0 years (10.0 ttl pk-yrs)     Types: Cigarettes    Smokeless tobacco: Never   Substance Use Topics    Alcohol use: Yes     Comment: rarely       No current outpatient medications on file.     No current facility-administered medications for this visit.     No Known Allergies  Vitals:    25 1304   BP: 121/85   Pulse: 88     Body mass index is 20.97 kg/m².      Colposcopy Procedure Note    Indications: Pap smear 1 months ago showed: low-grade squamous intraepithelial neoplasia (LGSIL - encompassing HPV,mild dysplasia,VINCE I). The prior pap showed atypical squamous cellularity of undetermined significance

## 2025-02-24 ENCOUNTER — OFFICE VISIT (OUTPATIENT)
Dept: OBGYN CLINIC | Age: 55
End: 2025-02-24

## 2025-02-24 VITALS
DIASTOLIC BLOOD PRESSURE: 78 MMHG | HEART RATE: 90 BPM | HEIGHT: 66 IN | BODY MASS INDEX: 19.93 KG/M2 | WEIGHT: 124 LBS | SYSTOLIC BLOOD PRESSURE: 114 MMHG

## 2025-02-24 DIAGNOSIS — R87.820 CERVICAL LOW RISK HUMAN PAPILLOMAVIRUS (HPV) DNA TEST POSITIVE: ICD-10-CM

## 2025-02-24 DIAGNOSIS — Z71.89 SURGICAL COUNSELING VISIT: Primary | ICD-10-CM

## 2025-02-24 DIAGNOSIS — D25.9 UTERINE LEIOMYOMA, UNSPECIFIED LOCATION: ICD-10-CM

## 2025-02-24 DIAGNOSIS — R87.612 LOW GRADE SQUAMOUS INTRAEPITHELIAL LESION ON CYTOLOGIC SMEAR OF CERVIX (LGSIL): ICD-10-CM

## 2025-03-05 ENCOUNTER — PREP FOR PROCEDURE (OUTPATIENT)
Dept: OBGYN CLINIC | Age: 55
End: 2025-03-05

## 2025-03-05 DIAGNOSIS — N93.9 ABNORMAL UTERINE BLEEDING: ICD-10-CM

## 2025-03-05 DIAGNOSIS — R87.820 CERVICAL LOW RISK HUMAN PAPILLOMAVIRUS (HPV) DNA TEST POSITIVE: ICD-10-CM

## 2025-03-05 DIAGNOSIS — R87.612 PAPANICOLAOU SMEAR OF CERVIX WITH LOW GRADE SQUAMOUS INTRAEPITHELIAL LESION (LGSIL): ICD-10-CM

## 2025-03-05 PROBLEM — D25.9 LEIOMYOMA OF UTERUS, UNSPECIFIED: Status: ACTIVE | Noted: 2025-03-05

## 2025-03-06 ENCOUNTER — TELEPHONE (OUTPATIENT)
Dept: OBGYN CLINIC | Age: 55
End: 2025-03-06

## 2025-03-06 NOTE — TELEPHONE ENCOUNTER
Patient called back to let Roseann know she paid the $35 fee. Please fax over Disability paper work.  Patient also has questions regarding prepaying for surgery.  Please return her call.      Thank you!

## 2025-03-12 RX ORDER — ACETAMINOPHEN 325 MG/1
1000 TABLET ORAL ONCE
Status: CANCELLED | OUTPATIENT
Start: 2025-03-12 | End: 2025-03-12

## 2025-03-12 RX ORDER — SODIUM CHLORIDE 0.9 % (FLUSH) 0.9 %
5-40 SYRINGE (ML) INJECTION PRN
Status: CANCELLED | OUTPATIENT
Start: 2025-03-12

## 2025-03-12 RX ORDER — CELECOXIB 200 MG/1
400 CAPSULE ORAL ONCE
Status: CANCELLED | OUTPATIENT
Start: 2025-03-12 | End: 2025-03-12

## 2025-03-12 RX ORDER — SODIUM CHLORIDE 0.9 % (FLUSH) 0.9 %
5-40 SYRINGE (ML) INJECTION EVERY 12 HOURS SCHEDULED
Status: CANCELLED | OUTPATIENT
Start: 2025-03-12

## 2025-03-12 RX ORDER — SODIUM CHLORIDE, SODIUM LACTATE, POTASSIUM CHLORIDE, CALCIUM CHLORIDE 600; 310; 30; 20 MG/100ML; MG/100ML; MG/100ML; MG/100ML
INJECTION, SOLUTION INTRAVENOUS CONTINUOUS
Status: CANCELLED | OUTPATIENT
Start: 2025-03-12

## 2025-03-12 RX ORDER — SODIUM CHLORIDE 9 MG/ML
INJECTION, SOLUTION INTRAVENOUS PRN
Status: CANCELLED | OUTPATIENT
Start: 2025-03-12

## 2025-03-14 ENCOUNTER — HOSPITAL ENCOUNTER (OUTPATIENT)
Dept: PREADMISSION TESTING | Age: 55
Discharge: HOME | End: 2025-03-18
Payer: COMMERCIAL

## 2025-03-14 VITALS — BODY MASS INDEX: 20.25 KG/M2 | HEIGHT: 66 IN | WEIGHT: 126 LBS

## 2025-03-14 LAB
ABO/RH: NORMAL
ANTIBODY SCREEN: NORMAL
ERYTHROCYTE [DISTWIDTH] IN BLOOD BY AUTOMATED COUNT: 12.9 % (ref 11.5–14.5)
HCT VFR BLD AUTO: 40.5 % (ref 37–47)
HGB BLD-MCNC: 13.2 G/DL (ref 12–16)
MCH RBC QN AUTO: 32.5 PG (ref 27–31)
MCHC RBC AUTO-ENTMCNC: 32.6 G/DL (ref 33–37)
MCV RBC AUTO: 99.8 FL (ref 81–99)
PLATELET # BLD AUTO: 326 K/UL (ref 130–400)
PMV BLD AUTO: 10.9 FL (ref 9.4–12.3)
RBC # BLD AUTO: 4.06 M/UL (ref 4.2–5.4)
WBC # BLD AUTO: 9.5 K/UL (ref 4.8–10.8)

## 2025-03-14 PROCEDURE — 85027 COMPLETE CBC AUTOMATED: CPT

## 2025-03-14 PROCEDURE — 86901 BLOOD TYPING SEROLOGIC RH(D): CPT

## 2025-03-14 PROCEDURE — 86850 RBC ANTIBODY SCREEN: CPT

## 2025-03-14 PROCEDURE — 86900 BLOOD TYPING SEROLOGIC ABO: CPT

## 2025-03-14 NOTE — DISCHARGE INSTRUCTIONS
The day before surgery you will receive a phone call from the surgery nurse to let you know what time to arrive on the day of surgery. This call will usually be between 2-4 PM. If you do not receive a phone call by 4 PM the day before your surgery please call [phone] and let them know you have not received an arrival time. If your surgery is on Monday, your call will be on the Friday before your Monday surgery. Please check your voicemail as they may leave a message with that information.  If you are running late or wake up sick the day of surgery please call the above number for further instructions.    Chlorhexidine Gluconate 4% Solution    Patient should shower with this soap  the night before surgery and the morning of surgery washing from the neck down (avoiding contact with genitalia).      DO NOT WASH YOUR HAIR OR FACE WITH THIS SOAP.  When washing with this soap, apply enough to suds up the body thoroughly, turn the water away from your body and allow the soap suds to remain on the body for 2 full minutes, then rinse body completely.      After using this soap on the body, please do not apply powders or lotions to your body.  After the shower the night before surgery, please dry off with a new towel, sleep in new freshly laundered pj's, and change your bed linen before going to sleep.      IF YOU HAVE A PET IN YOUR HOME, please do not allow your pet to sleep in the bed with you after you have showered with your surgery prep soap.     Please remember that it is not recommended to allow your pet to sleep with you post op, until your incision has healed.  This can increase your risk of post op infection.    PREOPERATIVE GUIDELINES WHEN RECEIVING ANESTHESIA    Do not eat anything after midnight the night before your surgery. You may have water up to 2 hours before your arrival time. No gum or candy the morning of surgery.  This is extremely important for your safety.    Take a bath (or shower) the night  Pt reported feeling better

## 2025-03-17 ENCOUNTER — TELEPHONE (OUTPATIENT)
Dept: OBGYN CLINIC | Age: 55
End: 2025-03-17

## 2025-03-17 NOTE — TELEPHONE ENCOUNTER
Called & spoke with pt about sx details. Nothing to eat after midnight. Pt can eat a light breakfast this morning. Pt is aware to start bowel prep at 1/2pm (2 mag citrate bottles). Clear liquids for dinner. I explained to pt about the iv fluid shortage and to stay hydrated as much as possible with water. Pt will need to stop drinking water 2 hrs prior to sx. Sx scheduling will call pt between 2-4pm today with arrival time. Pt verbalized understanding.   NAJMA Donaldson

## 2025-03-18 ENCOUNTER — ANESTHESIA (OUTPATIENT)
Dept: OPERATING ROOM | Age: 55
End: 2025-03-18
Payer: COMMERCIAL

## 2025-03-18 ENCOUNTER — ANESTHESIA EVENT (OUTPATIENT)
Dept: OPERATING ROOM | Age: 55
End: 2025-03-18
Payer: COMMERCIAL

## 2025-03-18 ENCOUNTER — HOSPITAL ENCOUNTER (OUTPATIENT)
Age: 55
Setting detail: OUTPATIENT SURGERY
Discharge: HOME | End: 2025-03-18
Attending: OBSTETRICS & GYNECOLOGY | Admitting: OBSTETRICS & GYNECOLOGY
Payer: COMMERCIAL

## 2025-03-18 VITALS
BODY MASS INDEX: 20.25 KG/M2 | OXYGEN SATURATION: 97 % | TEMPERATURE: 97 F | WEIGHT: 126 LBS | HEIGHT: 66 IN | HEART RATE: 59 BPM | RESPIRATION RATE: 16 BRPM | DIASTOLIC BLOOD PRESSURE: 75 MMHG | SYSTOLIC BLOOD PRESSURE: 135 MMHG

## 2025-03-18 DIAGNOSIS — R87.612 PAPANICOLAOU SMEAR OF CERVIX WITH LOW GRADE SQUAMOUS INTRAEPITHELIAL LESION (LGSIL): ICD-10-CM

## 2025-03-18 DIAGNOSIS — D25.9 LEIOMYOMA OF UTERUS, UNSPECIFIED: ICD-10-CM

## 2025-03-18 DIAGNOSIS — N93.9 ABNORMAL UTERINE BLEEDING: ICD-10-CM

## 2025-03-18 DIAGNOSIS — R87.820 CERVICAL LOW RISK HUMAN PAPILLOMAVIRUS (HPV) DNA TEST POSITIVE: ICD-10-CM

## 2025-03-18 DIAGNOSIS — Z90.710 S/P LAPAROSCOPIC HYSTERECTOMY: Primary | ICD-10-CM

## 2025-03-18 LAB
ABO/RH: NORMAL
ANTIBODY SCREEN: NORMAL

## 2025-03-18 PROCEDURE — 2500000003 HC RX 250 WO HCPCS

## 2025-03-18 PROCEDURE — 2580000003 HC RX 258: Performed by: OBSTETRICS & GYNECOLOGY

## 2025-03-18 PROCEDURE — 6360000002 HC RX W HCPCS

## 2025-03-18 PROCEDURE — 6360000002 HC RX W HCPCS: Performed by: OBSTETRICS & GYNECOLOGY

## 2025-03-18 PROCEDURE — 2500000003 HC RX 250 WO HCPCS: Performed by: OBSTETRICS & GYNECOLOGY

## 2025-03-18 PROCEDURE — 86850 RBC ANTIBODY SCREEN: CPT

## 2025-03-18 PROCEDURE — 3700000001 HC ADD 15 MINUTES (ANESTHESIA): Performed by: OBSTETRICS & GYNECOLOGY

## 2025-03-18 PROCEDURE — 6360000002 HC RX W HCPCS: Performed by: ANESTHESIOLOGY

## 2025-03-18 PROCEDURE — 2709999900 HC NON-CHARGEABLE SUPPLY: Performed by: OBSTETRICS & GYNECOLOGY

## 2025-03-18 PROCEDURE — 2500000003 HC RX 250 WO HCPCS: Performed by: ANESTHESIOLOGY

## 2025-03-18 PROCEDURE — 7100000000 HC PACU RECOVERY - FIRST 15 MIN: Performed by: OBSTETRICS & GYNECOLOGY

## 2025-03-18 PROCEDURE — 3700000000 HC ANESTHESIA ATTENDED CARE: Performed by: OBSTETRICS & GYNECOLOGY

## 2025-03-18 PROCEDURE — 7100000010 HC PHASE II RECOVERY - FIRST 15 MIN: Performed by: OBSTETRICS & GYNECOLOGY

## 2025-03-18 PROCEDURE — 6370000000 HC RX 637 (ALT 250 FOR IP): Performed by: OBSTETRICS & GYNECOLOGY

## 2025-03-18 PROCEDURE — 86900 BLOOD TYPING SEROLOGIC ABO: CPT

## 2025-03-18 PROCEDURE — 86901 BLOOD TYPING SEROLOGIC RH(D): CPT

## 2025-03-18 PROCEDURE — 36415 COLL VENOUS BLD VENIPUNCTURE: CPT

## 2025-03-18 PROCEDURE — 88309 TISSUE EXAM BY PATHOLOGIST: CPT

## 2025-03-18 PROCEDURE — 7100000011 HC PHASE II RECOVERY - ADDTL 15 MIN: Performed by: OBSTETRICS & GYNECOLOGY

## 2025-03-18 PROCEDURE — 7100000001 HC PACU RECOVERY - ADDTL 15 MIN: Performed by: OBSTETRICS & GYNECOLOGY

## 2025-03-18 PROCEDURE — 3600000019 HC SURGERY ROBOT ADDTL 15MIN: Performed by: OBSTETRICS & GYNECOLOGY

## 2025-03-18 PROCEDURE — 2720000010 HC SURG SUPPLY STERILE: Performed by: OBSTETRICS & GYNECOLOGY

## 2025-03-18 PROCEDURE — S2900 ROBOTIC SURGICAL SYSTEM: HCPCS | Performed by: OBSTETRICS & GYNECOLOGY

## 2025-03-18 PROCEDURE — 3600000009 HC SURGERY ROBOT BASE: Performed by: OBSTETRICS & GYNECOLOGY

## 2025-03-18 PROCEDURE — 58571 TLH W/T/O 250 G OR LESS: CPT | Performed by: OBSTETRICS & GYNECOLOGY

## 2025-03-18 PROCEDURE — 6370000000 HC RX 637 (ALT 250 FOR IP): Performed by: ANESTHESIOLOGY

## 2025-03-18 PROCEDURE — 2580000003 HC RX 258: Performed by: ANESTHESIOLOGY

## 2025-03-18 RX ORDER — SODIUM CHLORIDE 9 MG/ML
INJECTION, SOLUTION INTRAVENOUS PRN
Status: DISCONTINUED | OUTPATIENT
Start: 2025-03-18 | End: 2025-03-18 | Stop reason: HOSPADM

## 2025-03-18 RX ORDER — SODIUM CHLORIDE 0.9 % (FLUSH) 0.9 %
5-40 SYRINGE (ML) INJECTION EVERY 12 HOURS SCHEDULED
Status: DISCONTINUED | OUTPATIENT
Start: 2025-03-18 | End: 2025-03-18 | Stop reason: HOSPADM

## 2025-03-18 RX ORDER — EPHEDRINE SULFATE/0.9% NACL/PF 25 MG/5 ML
SYRINGE (ML) INTRAVENOUS
Status: DISCONTINUED | OUTPATIENT
Start: 2025-03-18 | End: 2025-03-18 | Stop reason: SDUPTHER

## 2025-03-18 RX ORDER — PROPOFOL 10 MG/ML
INJECTION, EMULSION INTRAVENOUS
Status: DISCONTINUED | OUTPATIENT
Start: 2025-03-18 | End: 2025-03-18 | Stop reason: SDUPTHER

## 2025-03-18 RX ORDER — ONDANSETRON 2 MG/ML
INJECTION INTRAMUSCULAR; INTRAVENOUS
Status: DISCONTINUED | OUTPATIENT
Start: 2025-03-18 | End: 2025-03-18 | Stop reason: SDUPTHER

## 2025-03-18 RX ORDER — LIDOCAINE HYDROCHLORIDE 10 MG/ML
INJECTION, SOLUTION INFILTRATION; PERINEURAL
Status: DISCONTINUED | OUTPATIENT
Start: 2025-03-18 | End: 2025-03-18 | Stop reason: SDUPTHER

## 2025-03-18 RX ORDER — OXYCODONE AND ACETAMINOPHEN 5; 325 MG/1; MG/1
1 TABLET ORAL EVERY 6 HOURS PRN
Qty: 12 TABLET | Refills: 0 | Status: SHIPPED | OUTPATIENT
Start: 2025-03-18 | End: 2025-03-21

## 2025-03-18 RX ORDER — HYDROMORPHONE HYDROCHLORIDE 1 MG/ML
0.5 INJECTION, SOLUTION INTRAMUSCULAR; INTRAVENOUS; SUBCUTANEOUS EVERY 5 MIN PRN
Status: DISCONTINUED | OUTPATIENT
Start: 2025-03-18 | End: 2025-03-18 | Stop reason: HOSPADM

## 2025-03-18 RX ORDER — DEXMEDETOMIDINE HYDROCHLORIDE 100 UG/ML
INJECTION, SOLUTION INTRAVENOUS
Status: DISCONTINUED | OUTPATIENT
Start: 2025-03-18 | End: 2025-03-18 | Stop reason: SDUPTHER

## 2025-03-18 RX ORDER — NALOXONE HYDROCHLORIDE 0.4 MG/ML
INJECTION, SOLUTION INTRAMUSCULAR; INTRAVENOUS; SUBCUTANEOUS PRN
Status: DISCONTINUED | OUTPATIENT
Start: 2025-03-18 | End: 2025-03-18 | Stop reason: HOSPADM

## 2025-03-18 RX ORDER — DEXAMETHASONE SODIUM PHOSPHATE 4 MG/ML
INJECTION, SOLUTION INTRA-ARTICULAR; INTRALESIONAL; INTRAMUSCULAR; INTRAVENOUS; SOFT TISSUE
Status: DISCONTINUED | OUTPATIENT
Start: 2025-03-18 | End: 2025-03-18 | Stop reason: SDUPTHER

## 2025-03-18 RX ORDER — ACETAMINOPHEN 500 MG
1000 TABLET ORAL ONCE
Status: COMPLETED | OUTPATIENT
Start: 2025-03-18 | End: 2025-03-18

## 2025-03-18 RX ORDER — PHENAZOPYRIDINE HYDROCHLORIDE 100 MG/1
200 TABLET, FILM COATED ORAL ONCE
Status: COMPLETED | OUTPATIENT
Start: 2025-03-18 | End: 2025-03-18

## 2025-03-18 RX ORDER — MEPERIDINE HYDROCHLORIDE 25 MG/ML
12.5 INJECTION INTRAMUSCULAR; INTRAVENOUS; SUBCUTANEOUS
Status: DISCONTINUED | OUTPATIENT
Start: 2025-03-18 | End: 2025-03-18 | Stop reason: HOSPADM

## 2025-03-18 RX ORDER — IBUPROFEN 800 MG/1
800 TABLET, FILM COATED ORAL EVERY 8 HOURS PRN
Qty: 90 TABLET | Refills: 0 | Status: SHIPPED | OUTPATIENT
Start: 2025-03-18

## 2025-03-18 RX ORDER — HYDRALAZINE HYDROCHLORIDE 20 MG/ML
10 INJECTION INTRAMUSCULAR; INTRAVENOUS
Status: DISCONTINUED | OUTPATIENT
Start: 2025-03-18 | End: 2025-03-18 | Stop reason: HOSPADM

## 2025-03-18 RX ORDER — LIDOCAINE HYDROCHLORIDE 10 MG/ML
1 INJECTION, SOLUTION EPIDURAL; INFILTRATION; INTRACAUDAL; PERINEURAL
Status: DISCONTINUED | OUTPATIENT
Start: 2025-03-18 | End: 2025-03-18 | Stop reason: HOSPADM

## 2025-03-18 RX ORDER — SODIUM CHLORIDE 0.9 % (FLUSH) 0.9 %
5-40 SYRINGE (ML) INJECTION PRN
Status: DISCONTINUED | OUTPATIENT
Start: 2025-03-18 | End: 2025-03-18 | Stop reason: HOSPADM

## 2025-03-18 RX ORDER — BUPIVACAINE HYDROCHLORIDE AND EPINEPHRINE 2.5; 5 MG/ML; UG/ML
INJECTION, SOLUTION INFILTRATION; PERINEURAL PRN
Status: DISCONTINUED | OUTPATIENT
Start: 2025-03-18 | End: 2025-03-18 | Stop reason: ALTCHOICE

## 2025-03-18 RX ORDER — ROCURONIUM BROMIDE 10 MG/ML
INJECTION, SOLUTION INTRAVENOUS
Status: DISCONTINUED | OUTPATIENT
Start: 2025-03-18 | End: 2025-03-18 | Stop reason: SDUPTHER

## 2025-03-18 RX ORDER — CELECOXIB 200 MG/1
400 CAPSULE ORAL ONCE
Status: COMPLETED | OUTPATIENT
Start: 2025-03-18 | End: 2025-03-18

## 2025-03-18 RX ORDER — DIPHENHYDRAMINE HYDROCHLORIDE 50 MG/ML
12.5 INJECTION, SOLUTION INTRAMUSCULAR; INTRAVENOUS
Status: DISCONTINUED | OUTPATIENT
Start: 2025-03-18 | End: 2025-03-18 | Stop reason: HOSPADM

## 2025-03-18 RX ORDER — SODIUM CHLORIDE, SODIUM LACTATE, POTASSIUM CHLORIDE, CALCIUM CHLORIDE 600; 310; 30; 20 MG/100ML; MG/100ML; MG/100ML; MG/100ML
INJECTION, SOLUTION INTRAVENOUS CONTINUOUS
Status: DISCONTINUED | OUTPATIENT
Start: 2025-03-18 | End: 2025-03-18 | Stop reason: HOSPADM

## 2025-03-18 RX ORDER — OXYCODONE AND ACETAMINOPHEN 5; 325 MG/1; MG/1
1 TABLET ORAL ONCE
Refills: 0 | Status: COMPLETED | OUTPATIENT
Start: 2025-03-18 | End: 2025-03-18

## 2025-03-18 RX ORDER — IPRATROPIUM BROMIDE AND ALBUTEROL SULFATE 2.5; .5 MG/3ML; MG/3ML
1 SOLUTION RESPIRATORY (INHALATION)
Status: DISCONTINUED | OUTPATIENT
Start: 2025-03-18 | End: 2025-03-18 | Stop reason: HOSPADM

## 2025-03-18 RX ORDER — PROCHLORPERAZINE EDISYLATE 5 MG/ML
5 INJECTION INTRAMUSCULAR; INTRAVENOUS
Status: DISCONTINUED | OUTPATIENT
Start: 2025-03-18 | End: 2025-03-18 | Stop reason: HOSPADM

## 2025-03-18 RX ORDER — HYDROMORPHONE HYDROCHLORIDE 1 MG/ML
0.25 INJECTION, SOLUTION INTRAMUSCULAR; INTRAVENOUS; SUBCUTANEOUS EVERY 5 MIN PRN
Status: DISCONTINUED | OUTPATIENT
Start: 2025-03-18 | End: 2025-03-18 | Stop reason: HOSPADM

## 2025-03-18 RX ORDER — OXYCODONE AND ACETAMINOPHEN 5; 325 MG/1; MG/1
1 TABLET ORAL ONCE
Refills: 0 | Status: CANCELLED | OUTPATIENT
Start: 2025-03-18

## 2025-03-18 RX ORDER — LABETALOL HYDROCHLORIDE 5 MG/ML
10 INJECTION, SOLUTION INTRAVENOUS
Status: DISCONTINUED | OUTPATIENT
Start: 2025-03-18 | End: 2025-03-18 | Stop reason: HOSPADM

## 2025-03-18 RX ORDER — FENTANYL CITRATE 50 UG/ML
INJECTION, SOLUTION INTRAMUSCULAR; INTRAVENOUS
Status: DISCONTINUED | OUTPATIENT
Start: 2025-03-18 | End: 2025-03-18 | Stop reason: SDUPTHER

## 2025-03-18 RX ORDER — KETOROLAC TROMETHAMINE 30 MG/ML
INJECTION, SOLUTION INTRAMUSCULAR; INTRAVENOUS
Status: DISCONTINUED | OUTPATIENT
Start: 2025-03-18 | End: 2025-03-18 | Stop reason: SDUPTHER

## 2025-03-18 RX ORDER — MIDAZOLAM HYDROCHLORIDE 1 MG/ML
INJECTION, SOLUTION INTRAMUSCULAR; INTRAVENOUS
Status: DISCONTINUED | OUTPATIENT
Start: 2025-03-18 | End: 2025-03-18 | Stop reason: SDUPTHER

## 2025-03-18 RX ORDER — SCOPOLAMINE 1 MG/3D
1 PATCH, EXTENDED RELEASE TRANSDERMAL
Status: DISCONTINUED | OUTPATIENT
Start: 2025-03-18 | End: 2025-03-18 | Stop reason: HOSPADM

## 2025-03-18 RX ADMIN — LIDOCAINE HYDROCHLORIDE 50 MG: 10 INJECTION, SOLUTION INFILTRATION; PERINEURAL at 10:43

## 2025-03-18 RX ADMIN — HYDROMORPHONE HYDROCHLORIDE 0.25 MG: 1 INJECTION, SOLUTION INTRAMUSCULAR; INTRAVENOUS; SUBCUTANEOUS at 12:56

## 2025-03-18 RX ADMIN — ROCURONIUM BROMIDE 50 MG: 10 INJECTION, SOLUTION INTRAVENOUS at 10:43

## 2025-03-18 RX ADMIN — DEXMEDETOMIDINE HYDROCHLORIDE 6 MCG: 100 INJECTION, SOLUTION, CONCENTRATE INTRAVENOUS at 11:07

## 2025-03-18 RX ADMIN — PHENYLEPHRINE HYDROCHLORIDE 200 MCG: 10 INJECTION INTRAVENOUS at 10:57

## 2025-03-18 RX ADMIN — CELECOXIB 400 MG: 200 CAPSULE ORAL at 08:51

## 2025-03-18 RX ADMIN — PROPOFOL 200 MG: 10 INJECTION, EMULSION INTRAVENOUS at 10:43

## 2025-03-18 RX ADMIN — SODIUM CHLORIDE, SODIUM LACTATE, POTASSIUM CHLORIDE, AND CALCIUM CHLORIDE: .6; .31; .03; .02 INJECTION, SOLUTION INTRAVENOUS at 08:51

## 2025-03-18 RX ADMIN — FENTANYL CITRATE 50 MCG: 0.05 INJECTION, SOLUTION INTRAMUSCULAR; INTRAVENOUS at 11:10

## 2025-03-18 RX ADMIN — PHENAZOPYRIDINE 200 MG: 100 TABLET ORAL at 08:51

## 2025-03-18 RX ADMIN — HYDROMORPHONE HYDROCHLORIDE 0.25 MG: 1 INJECTION, SOLUTION INTRAMUSCULAR; INTRAVENOUS; SUBCUTANEOUS at 13:01

## 2025-03-18 RX ADMIN — FENTANYL CITRATE 100 MCG: 0.05 INJECTION, SOLUTION INTRAMUSCULAR; INTRAVENOUS at 10:46

## 2025-03-18 RX ADMIN — SUGAMMADEX 250 MG: 100 INJECTION, SOLUTION INTRAVENOUS at 12:14

## 2025-03-18 RX ADMIN — ONDANSETRON 4 MG: 2 INJECTION INTRAMUSCULAR; INTRAVENOUS at 12:12

## 2025-03-18 RX ADMIN — MIDAZOLAM 2 MG: 1 INJECTION INTRAMUSCULAR; INTRAVENOUS at 10:41

## 2025-03-18 RX ADMIN — ACETAMINOPHEN 1000 MG: 500 TABLET ORAL at 08:51

## 2025-03-18 RX ADMIN — OXYCODONE HYDROCHLORIDE AND ACETAMINOPHEN 1 TABLET: 5; 325 TABLET ORAL at 13:43

## 2025-03-18 RX ADMIN — KETOROLAC TROMETHAMINE 30 MG: 30 INJECTION, SOLUTION INTRAMUSCULAR; INTRAVENOUS at 12:12

## 2025-03-18 RX ADMIN — EPHEDRINE SULFATE 10 MG: 5 INJECTION INTRAVENOUS at 10:58

## 2025-03-18 RX ADMIN — CEFAZOLIN 2000 MG: 1 INJECTION, POWDER, FOR SOLUTION INTRAMUSCULAR; INTRAVENOUS at 10:51

## 2025-03-18 RX ADMIN — SODIUM CHLORIDE, PRESERVATIVE FREE 20 MG: 5 INJECTION INTRAVENOUS at 09:24

## 2025-03-18 RX ADMIN — DEXAMETHASONE SODIUM PHOSPHATE 10 MG: 4 INJECTION, SOLUTION INTRAMUSCULAR; INTRAVENOUS at 10:51

## 2025-03-18 ASSESSMENT — ENCOUNTER SYMPTOMS
GASTROINTESTINAL NEGATIVE: 1
RESPIRATORY NEGATIVE: 1
ALLERGIC/IMMUNOLOGIC NEGATIVE: 1
EYES NEGATIVE: 1

## 2025-03-18 ASSESSMENT — PAIN DESCRIPTION - LOCATION
LOCATION: ABDOMEN

## 2025-03-18 ASSESSMENT — PAIN SCALES - GENERAL
PAINLEVEL_OUTOF10: 7
PAINLEVEL_OUTOF10: 6
PAINLEVEL_OUTOF10: 2

## 2025-03-18 ASSESSMENT — PAIN - FUNCTIONAL ASSESSMENT
PAIN_FUNCTIONAL_ASSESSMENT: 0-10
PAIN_FUNCTIONAL_ASSESSMENT: PREVENTS OR INTERFERES SOME ACTIVE ACTIVITIES AND ADLS
PAIN_FUNCTIONAL_ASSESSMENT: 0-10
PAIN_FUNCTIONAL_ASSESSMENT: 0-10

## 2025-03-18 ASSESSMENT — PAIN DESCRIPTION - DESCRIPTORS
DESCRIPTORS: DISCOMFORT
DESCRIPTORS: DISCOMFORT
DESCRIPTORS: ACHING

## 2025-03-18 ASSESSMENT — LIFESTYLE VARIABLES: SMOKING_STATUS: 1

## 2025-03-18 ASSESSMENT — PAIN DESCRIPTION - ORIENTATION: ORIENTATION: LOWER

## 2025-03-18 NOTE — H&P
HPI  Francine Tomlinson is a 55 y.o. female with h/o cervical dysplasia who presents for hysterectomy.      Review of Systems   Constitutional: Negative.    HENT: Negative.     Eyes: Negative.    Respiratory: Negative.     Cardiovascular: Negative.    Gastrointestinal: Negative.    Endocrine: Negative.    Genitourinary: Negative.    Musculoskeletal: Negative.    Skin: Negative.    Allergic/Immunologic: Negative.    Neurological: Negative.    Hematological: Negative.    Psychiatric/Behavioral: Negative.         History reviewed. No pertinent past medical history.  Past Surgical History:   Procedure Laterality Date    BREAST ENHANCEMENT SURGERY  2019    CERVICAL CERCLAGE      x4; was put in during every pregnancy then take it out; due to stage 4 dysplagia     SECTION      x2    DILATION AND CURETTAGE OF UTERUS      due to stage 4 dysplagia    DILATION AND CURETTAGE OF UTERUS N/A 2020    DILATATION AND CURETTAGE HYSTEROSCOPY W/ NUVASURE performed by Zeny Garay MD at [facility] OR    ENDOMETRIAL ABLATION      TUBAL LIGATION      Dr. Menjivar     Family History   Problem Relation Age of Onset    Other Mother         IRREGULAR HEART RATE    Stroke Father      Social History     Tobacco Use    Smoking status: Every Day     Current packs/day: 0.50     Average packs/day: 0.5 packs/day for 30.2 years (15.1 ttl pk-yrs)     Types: Cigarettes     Start date:     Smokeless tobacco: Never   Vaping Use    Vaping status: Some Days    Start date: 2020    Substances: Nicotine   Substance Use Topics    Alcohol use: Yes     Comment: rarely    Drug use: Never     No current facility-administered medications on file prior to encounter.     No current outpatient medications on file prior to encounter.     No Known Allergies  /83   Pulse 80   Temp 97.4 °F (36.3 °C) (Temporal)   Resp 18   Ht 1.676 m (5' 6\")   Wt 57.2 kg (126 lb)   LMP 2021   SpO2 99%   BMI 20.34 kg/m²   Physical

## 2025-03-18 NOTE — PROGRESS NOTES
CLINICAL PHARMACY NOTE: MEDS TO BEDS    Total # of Prescriptions Filled: 2   The following medications were delivered to the patient:  Discharge Medication List as of 3/18/2025  1:27 PM        START taking these medications    Details   oxyCODONE-acetaminophen (PERCOCET) 5-325 MG per tablet Take 1 tablet by mouth every 6 hours as needed for Pain for up to 3 days. Intended supply: 3 days. Take lowest dose possible to manage pain Max Daily Amount: 4 tablets, Disp-12 tablet, R-0Normal      ibuprofen (ADVIL;MOTRIN) 800 MG tablet Take 1 tablet by mouth every 8 hours as needed for Pain, Disp-90 tablet, R-0Normal               Additional Documentation:    Patient picked up RX at [facility]. Paid with card.

## 2025-03-18 NOTE — ANESTHESIA POSTPROCEDURE EVALUATION
Department of Anesthesiology  Postprocedure Note    Patient: Francine Tomlinson  MRN: [id number]  YOB: 1970  Date of evaluation: 3/18/2025    Procedure Summary       Date: 03/18/25 Room / Location: [address]    Anesthesia Start: 1039 Anesthesia Stop: 1237    Procedure: ROBOTIC TOTAL LAPAROSCOPIC ABDOMINAL HYSTERECTOMY, BILATERAL SALPINGO-OOPHORECTOMY, CYSTOSCOPY (Bilateral: Abdomen/Perineum) Diagnosis:       Papanicolaou smear of cervix with low grade squamous intraepithelial lesion (LGSIL)      Cervical low risk human papillomavirus (HPV) DNA test positive      Leiomyoma of uterus, unspecified      Abnormal uterine bleeding      (Papanicolaou smear of cervix with low grade squamous intraepithelial lesion (LGSIL) [R87.612])      (Cervical low risk human papillomavirus (HPV) DNA test positive [R87.820])      (Leiomyoma of uterus, unspecified [D25.9])      (Abnormal uterine bleeding [N93.9])    Surgeons: Isabel Haji MD Responsible Provider: Neeta Tom APRN - CRNA    Anesthesia Type: general ASA Status: 2            Anesthesia Type: No value filed.    Kuldip Phase I: Kuldip Score: 10    Kuldip Phase II:      Anesthesia Post Evaluation    Patient location during evaluation: PACU  Patient participation: waiting for patient participation  Level of consciousness: responsive to painful stimuli  Airway patency: patent  Nausea & Vomiting: no nausea  Cardiovascular status: hemodynamically stable  Respiratory status: acceptable and oral airway  Hydration status: stable  Comments: /70   Pulse 77   Temp 97 °F (36.1 °C)   Resp 16   Ht 1.676 m (5' 6\")   Wt 57.2 kg (126 lb)   LMP 01/01/2021   SpO2 95%   BMI 20.34 kg/m²     Pain management: adequate    No notable events documented.

## 2025-03-18 NOTE — ANESTHESIA PRE PROCEDURE
Department of Anesthesiology  Preprocedure Note       Name:  Francine Tomlinson   Age:  55 y.o.  :  1970                                          MRN:  [id number]         Date:  3/18/2025      Surgeon: Surgeon(s):  Isabel Haji MD    Procedure: Procedure(s):  ROBOTIC TOTAL LAPAROSCOPIC ABDOMINAL HYSTERECTOMY, BILATERAL SALPINGO-OOPHORECTOMY, CYSTOSCOPY    Medications prior to admission:   Prior to Admission medications    Not on File       Current medications:    Current Facility-Administered Medications   Medication Dose Route Frequency Provider Last Rate Last Admin   • lactated ringers infusion   IntraVENous Continuous Isabel Haji  mL/hr at 25 0851 New Bag at 25 0851   • sodium chloride flush 0.9 % injection 5-40 mL  5-40 mL IntraVENous 2 times per day Isabel Haji MD       • sodium chloride flush 0.9 % injection 5-40 mL  5-40 mL IntraVENous PRN Isabel Haji MD       • 0.9 % sodium chloride infusion   IntraVENous PRN Isabel Haji MD       • ceFAZolin (ANCEF) 2,000 mg in sterile water 20 mL IV syringe  2,000 mg IntraVENous On Call to OR Isabel Haji MD           Allergies:  No Known Allergies    Problem List:    Patient Active Problem List   Diagnosis Code   • Abnormal uterine bleeding (AUB) N93.9   • Papanicolaou smear of cervix with low grade squamous intraepithelial lesion (LGSIL) R87.612   • Cervical low risk human papillomavirus (HPV) DNA test positive R87.820   • Leiomyoma of uterus, unspecified D25.9   • Abnormal uterine bleeding N93.9       Past Medical History:  History reviewed. No pertinent past medical history.    Past Surgical History:        Procedure Laterality Date   • BREAST ENHANCEMENT SURGERY  2019   • CERVICAL CERCLAGE      x4; was put in during every pregnancy then take it out; due to stage 4 dysplagia   •  SECTION      x2   • DILATION AND CURETTAGE OF UTERUS      due to stage 4 dysplagia   •

## 2025-03-18 NOTE — OP NOTE
Operative Note      Patient: Francine Tomlinson  YOB: 1970  MRN: [id number]    Date of Procedure: 3/18/2025    Pre-Op Diagnosis Codes:      * Papanicolaou smear of cervix with low grade squamous intraepithelial lesion (LGSIL) [R87.612]     * Cervical low risk human papillomavirus (HPV) DNA test positive [R87.820]     * Leiomyoma of uterus, unspecified [D25.9]     * Abnormal uterine bleeding [N93.9]    Post-Op Diagnosis: {MH OR SAME:[id number]}       Procedure(s):  ROBOTIC TOTAL LAPAROSCOPIC ABDOMINAL HYSTERECTOMY, BILATERAL SALPINGO-OOPHORECTOMY, CYSTOSCOPY    Surgeon(s):  Isabel Haji MD    Assistant:   Surgical Assistant: Ness Mcgrath LPN  First Assistant: Tejal Barton RN    Anesthesia: General    Estimated Blood Loss (mL): {NUMBERS; EBL:18918}    Complications: {Symptoms; Intra-op complications:73176}    Specimens:   ID Type Source Tests Collected by Time Destination   A : Uterus, cervix, bilateral tubes, bilateral ovaries 30 grams Tissue Uterus SURGICAL PATHOLOGY Isabel Haji MD 3/18/2025 1200        Implants:  * No implants in log *      Drains: * No LDAs found *    Findings:  Infection Present At Time Of Surgery (PATOS) (choose all levels that have infection present):  {PATOS LEVELS:[id number]}  Other Findings: ***    Detailed Description of Procedure:     The patient was taken to the operating room where she was placed under general anesthesia.  She was positioned in dorsal lithotomy, prepped and draped in the usual sterile fashion.  A weighted speculum was then placed into the vagina, and the anterior lip of the cervix was grasped with a single-tooth tenaculum.  The cervix was serially dilated to accommodate a *** V-Care Uterine Manipulator, which was placed without complication.      Attention was then turned to the abdomen where a supraumbilical incision was made.  Veress needle was placed.  Placement was confirmed with saline drop test and aspiration of air.

## 2025-03-18 NOTE — DISCHARGE INSTRUCTIONS
Nothing in the vagina for 8 weeks. No sex, no tampons, no bath, no douche. No heavy lifting or straining. Vaginal bleeding may occur while healing. If like a period or heavier, you are doing too much. Rest with your feet elevated for next 48 hours and it should taper off. Call MD with fever > 101, foul smelling vaginal discharge or any other questions or concerns.     Laparoscopic Oophorectomy: What to Expect at Home  Your Recovery     Laparoscopic oophorectomy is surgery to remove one, both, or part of your ovaries. Your doctor put a lighted tube (scope) and other tools through small cuts in your belly to do this.  After surgery, you may feel some pain in your belly for a few days. Your belly may also be swollen. You may have a change in your bowel movements for a few days.  You may also have shoulder pain for a day or two. This is caused by the air your doctor put in your belly to help see your organs better.  To help with pain, your doctor may prescribe medicines. You may need about 1 week to fully recover. Avoid strenuous activity and lifting anything heavy while you recover. You can ask your doctor when it's okay to have sex.  If you had both ovaries removed, you will start menopause if you haven't already. Your doctor may prescribe you hormone therapy.  This care sheet gives you a general idea about how long it will take for you to recover. But each person recovers at a different pace. Follow the steps below to get better as quickly as possible.  How can you care for yourself at home?  Activity    Rest when you feel tired.     Be active. Walking is a good choice.     Allow your body to heal. Don't move quickly or lift anything heavy until you are feeling better.     Hold a pillow over your incisions when you cough or take deep breaths. This will support your belly and may help to decrease your pain.     Do breathing exercises at home as instructed by your doctor. This will help prevent pneumonia.     Ask your  you are having problems. It's also a good idea to know your test results and keep a list of the medicines you take.  When should you call for help?   Call 911 anytime you think you may need emergency care. For example, call if:    You passed out (lost consciousness).     You have chest pain, are short of breath, or cough up blood.   Call your doctor now or seek immediate medical care if:    You have pain that does not get better after you take pain medicine.     You cannot pass stools or gas.     You have vaginal discharge that has increased in amount or smells bad.     You are sick to your stomach or cannot drink fluids.     You have loose stitches, or your incision comes open.     Bright red blood has soaked through the bandage over your incision.     You have signs of infection, such as:  Increased pain, swelling, warmth, or redness.  Red streaks leading from the incision.  Pus draining from the incision.  A fever.     You have severe vaginal bleeding. This means that you are soaking through your usual pads every hour for 2 or more hours.     You have signs of a blood clot in your leg (called a deep vein thrombosis), such as:  Pain in your calf, back of the knee, thigh, or groin.  Redness and swelling in your leg.   Watch closely for changes in your health, and be sure to contact your doctor if you have any problems.  Where can you learn more?  Go to https://www.[employer].net/patientEd and enter L619 to learn more about \"Laparoscopic Oophorectomy: What to Expect at Home.\"  Current as of: April 30, 2024  Content Version: 14.4  © 2024-2025 [employer].   Care instructions adapted under license by [employer]. If you have questions about a medical condition or this instruction, always ask your healthcare professional. [employer] disclaims any warranty or liability for your use of this information.

## 2025-04-03 ENCOUNTER — OFFICE VISIT (OUTPATIENT)
Dept: OBGYN CLINIC | Age: 55
End: 2025-04-03

## 2025-04-03 VITALS
WEIGHT: 126 LBS | BODY MASS INDEX: 20.25 KG/M2 | HEIGHT: 66 IN | SYSTOLIC BLOOD PRESSURE: 127 MMHG | HEART RATE: 102 BPM | DIASTOLIC BLOOD PRESSURE: 78 MMHG

## 2025-04-03 DIAGNOSIS — Z48.89 POSTOPERATIVE VISIT: Primary | ICD-10-CM

## 2025-04-03 DIAGNOSIS — Z90.79 S/P TOTAL HYSTERECTOMY AND BSO (BILATERAL SALPINGO-OOPHORECTOMY): ICD-10-CM

## 2025-04-03 DIAGNOSIS — Z90.710 S/P TOTAL HYSTERECTOMY AND BSO (BILATERAL SALPINGO-OOPHORECTOMY): ICD-10-CM

## 2025-04-03 DIAGNOSIS — Z90.722 S/P TOTAL HYSTERECTOMY AND BSO (BILATERAL SALPINGO-OOPHORECTOMY): ICD-10-CM

## 2025-04-03 PROCEDURE — 99024 POSTOP FOLLOW-UP VISIT: CPT | Performed by: OBSTETRICS & GYNECOLOGY

## 2025-04-03 NOTE — PROGRESS NOTES
Pt is here today for a 2 wk post op visit following TLH/BSO 3/18/25, had bleeding 1 wk after sx, some lower pelvic pain         Francine Tomlinson (:  1970) is a 55 y.o. female, Established patient, here for evaluation of the following chief complaint(s):  Post-Op Check        Subjective   History of Present Illness  The patient presents for evaluation following recent surgery.    No pain is reported at the incision sites, but discomfort is noted in the hip region. Intermittent bleeding began a week post-surgery, and persistent yellow discharge has been present since the day of the procedure. There is no associated itching, burning, or malodor. A support garment has been worn continuously, only removed during showers every 3 days, with surgical strips remaining intact. Bruising around one incision site was initially painful but has since improved. Bowel movements were regular until the use of Percocet, which caused constipation. The medication was discontinued after 4 to 5 days, and a laxative was used to alleviate the constipation. A stool softener is currently being taken and found beneficial. A history of stage IV dysplasia progressing towards cervical cancer was noted from her first Pap smear at age 20, which required surgical intervention.    Concern is expressed about preoperative blood work showing low MCH and high MCHC levels, questioning the need for B12 supplementation.    PAST SURGICAL HISTORY:  - Surgical intervention for stage IV dysplasia progressing towards cervical cancer at age 20    Review of Systems   Constitutional: Negative.    HENT: Negative.     Eyes: Negative.    Respiratory: Negative.     Cardiovascular: Negative.    Gastrointestinal: Negative.    Endocrine: Negative.    Genitourinary: Negative.    Musculoskeletal: Negative.    Skin: Negative.    Allergic/Immunologic: Negative.    Neurological: Negative.    Hematological: Negative.    Psychiatric/Behavioral: Negative.

## 2025-04-28 ENCOUNTER — OFFICE VISIT (OUTPATIENT)
Dept: OBGYN CLINIC | Age: 55
End: 2025-04-28

## 2025-04-28 VITALS
SYSTOLIC BLOOD PRESSURE: 124 MMHG | HEIGHT: 66 IN | WEIGHT: 122 LBS | HEART RATE: 71 BPM | BODY MASS INDEX: 19.61 KG/M2 | DIASTOLIC BLOOD PRESSURE: 80 MMHG

## 2025-04-28 DIAGNOSIS — Z90.710 S/P TOTAL HYSTERECTOMY AND BSO (BILATERAL SALPINGO-OOPHORECTOMY): ICD-10-CM

## 2025-04-28 DIAGNOSIS — Z90.722 S/P TOTAL HYSTERECTOMY AND BSO (BILATERAL SALPINGO-OOPHORECTOMY): ICD-10-CM

## 2025-04-28 DIAGNOSIS — Z48.89 POSTOPERATIVE VISIT: Primary | ICD-10-CM

## 2025-04-28 DIAGNOSIS — Z90.79 S/P TOTAL HYSTERECTOMY AND BSO (BILATERAL SALPINGO-OOPHORECTOMY): ICD-10-CM

## 2025-04-28 DIAGNOSIS — R30.0 DYSURIA: ICD-10-CM

## 2025-04-28 PROCEDURE — 99024 POSTOP FOLLOW-UP VISIT: CPT | Performed by: OBSTETRICS & GYNECOLOGY

## 2025-04-28 RX ORDER — FLUCONAZOLE 150 MG/1
150 TABLET ORAL ONCE
Qty: 1 TABLET | Refills: 0 | Status: SHIPPED | OUTPATIENT
Start: 2025-04-28 | End: 2025-04-28

## 2025-04-28 NOTE — PROGRESS NOTES
Francine Tomlinson (:  1970) is a 55 y.o. female, Established patient, here for evaluation of the following chief complaint(s):  Post-Op Check        Subjective   History of Present Illness  The patient presents for a 6-week postoperative visit status post hysterectomy.    Dysuria is reported, characterized by pain during the terminal phase of urination, over the past few days. This discomfort is distinct from the burning sensation typically associated with urinary tract infections. Additionally, complete bladder emptying requires adopting a specific posture, leaning back slightly. A urine sample was collected prior to this consultation.    Stress urinary incontinence is reported, with episodes of leakage occurring during coughing or sneezing. This issue has been present since the birth of her children. Sexual intercourse has not been engaged in since the surgery.    PAST SURGICAL HISTORY: Hysterectomy    Review of Systems       Objective   Blood pressure 124/80, pulse 71, height 1.676 m (5' 6\"), weight 55.3 kg (122 lb), last menstrual period 2021, not currently breastfeeding.  Physical Exam  Constitutional:       General: She is not in acute distress.     Appearance: She is well-developed.   HENT:      Head: Normocephalic and atraumatic.   Eyes:      Conjunctiva/sclera: Conjunctivae normal.      Pupils: Pupils are equal, round, and reactive to light.   Pulmonary:      Effort: Pulmonary effort is normal. No respiratory distress.   Abdominal:      General: There is no distension.      Palpations: Abdomen is soft.      Tenderness: There is no abdominal tenderness.   Genitourinary:     General: Normal vulva.      Labia:         Right: No rash, tenderness, lesion or injury.         Left: No rash, tenderness, lesion or injury.       Urethra: No prolapse, urethral swelling or urethral lesion.      Vagina: Normal.      Rectum: Normal.      Comments: Vaginal cuff intact, well-healed, NT  Musculoskeletal:

## 2025-04-28 NOTE — PROGRESS NOTES
Pt is here today for a 6 wk post op visit following TLH/BSO 3/18/25, pt states its hard for her empty her bladder fully, sometimes painful to urinate when trying to empty bladder

## 2025-04-29 ENCOUNTER — RESULTS FOLLOW-UP (OUTPATIENT)
Dept: OBGYN CLINIC | Age: 55
End: 2025-04-29

## 2025-04-30 LAB
BACTERIA UR CULT: ABNORMAL
BACTERIA UR CULT: ABNORMAL
ORGANISM: ABNORMAL

## (undated) DEVICE — SUCTION IRRIGATOR: Brand: ENDOWRIST

## (undated) DEVICE — APPLICATOR LAP 35 CM 2 RIGID VISTASEAL

## (undated) DEVICE — SUTURE MONOCRYL SZ 4-0 L18IN ABSRB UD L19MM PS-2 3/8 CIR PRIM Y496G

## (undated) DEVICE — PAD,NON-ADHERENT,3X8,STERILE,LF,1/PK: Brand: MEDLINE

## (undated) DEVICE — SURGICAL PROCEDURE PACK GYNECOLOGIC MIN LOURDES HOSP

## (undated) DEVICE — KIT CANSTR VAC TANTEM TB FOR AQUILEX FLD CTRL SYS

## (undated) DEVICE — AIRSEAL 8 MM ACCESS PORT AND LOW PROFILE OBTURATOR WITH BLADELESS OPTICAL TIP, 120 MM LENGTH: Brand: AIRSEAL

## (undated) DEVICE — TRI-LUMEN FILTERED TUBE SET WITH ACTIVATED CHARCOAL FILTER: Brand: AIRSEAL

## (undated) DEVICE — ARM DRAPE

## (undated) DEVICE — COLUMN DRAPE

## (undated) DEVICE — SYRINGE MED 10ML TRNSLUC BRL PLUNG BLK MRK POLYPR CTRL

## (undated) DEVICE — HYPODERMIC SAFETY NEEDLE: Brand: MAGELLAN

## (undated) DEVICE — VCARE LARGE UTERINE MANIPULATOR: Brand: VCARE LARGE

## (undated) DEVICE — STRIP SKIN CLSR W0.25XL4IN WHT SPUNBOUND FBR NYL HI ADH

## (undated) DEVICE — COVER LT HNDL BLU PLAS

## (undated) DEVICE — LEGGINGS, PAIR, CLEAR, STERILE: Brand: MEDLINE

## (undated) DEVICE — SEAL ENDO INSTR SELF SEAL UROLOGY

## (undated) DEVICE — GOWN, ORBIS, XLONG/XLARGE, STERILE: Brand: MEDLINE

## (undated) DEVICE — SOLUTION IV 1000ML LAC RINGERS PH 6.5 INJ USP VIAFLX PLAS

## (undated) DEVICE — GLOVE SURG SZ 7 L12IN FNGR THK79MIL GRN LTX FREE

## (undated) DEVICE — VESSEL SEALER EXTEND: Brand: ENDOWRIST

## (undated) DEVICE — GLOVE SURG SZ 75 CRM LTX FREE POLYISOPRENE POLYMER BEAD ANTI

## (undated) DEVICE — TUBE ET 7.5MM NSL ORAL BASIC CUF INTMED MURPHY EYE RADPQ

## (undated) DEVICE — Z INACTIVE USE 2660664 SOLUTION IRRIG 3000ML 0.9% SOD CHL USP UROMATIC PLAS CONT

## (undated) DEVICE — SOLUTION IV 1000ML 0.9% SOD CHL FOR IRRIG PLAS CONT

## (undated) DEVICE — BINDER ABD SM M W12IN CIRC 30 45IN 4 PNL E CNTCT CLSR POSTOP

## (undated) DEVICE — INSUFFLATION NEEDLE TO ESTABLISH PNEUMOPERITONEUM.: Brand: INSUFFLATION NEEDLE

## (undated) DEVICE — AMBU AURA-I U SIZE 3, DISPOSABLE LARYNGEAL MASK: Brand: AURA-I

## (undated) DEVICE — 40595 XL TRENDELENBURG POSITIONING KIT: Brand: 40595 XL TRENDELENBURG POSITIONING KIT

## (undated) DEVICE — BLADELESS OBTURATOR: Brand: WECK VISTA

## (undated) DEVICE — TIP COVER ACCESSORY

## (undated) DEVICE — VCARE MEDIUM, UTERINE MANIPULATOR, VAGINAL-CERVICAL-AHLUWALIA'S-RETRACTOR-ELEVATOR: Brand: VCARE

## (undated) DEVICE — PASSER SUT TRANSOSSEOUS MALL SHFT DURABLE NIT CAPTURE LOOP

## (undated) DEVICE — Z DISCONTINUED NO SUB IDED KIT ENDOMET ABLAT IMPED CTRL DEV W/ RF CTRL FTSWCH SUCT LN

## (undated) DEVICE — SOLUTION IV IRRIG POUR BRL 0.9% SODIUM CHL 2F7124

## (undated) DEVICE — MASTISOL ADHESIVE LIQ 2/3ML

## (undated) DEVICE — SEAL

## (undated) DEVICE — SOUND/DILATOR (ACCESSORY FOR CLEARVIEW UTERINE MANIPULATOR): Brand: CLEARVIEW

## (undated) DEVICE — SEALANT TISS 10 ML FIBRIN VISTASEAL

## (undated) DEVICE — VCARE SMALL, UTERINE MANIPULATOR, VAGINAL-CERVICAL-AHLUWALIA'S-RETRACTOR-ELEVATOR: Brand: VCARE

## (undated) DEVICE — Y-TYPE TUR/BLADDER IRRIGATION SET, REGULATING CLAMP

## (undated) DEVICE — SET ENDOSCP SEAL HYSTEROSCOPE RIG OUTFLO CHN DISP MYOSURE

## (undated) DEVICE — TOWEL,OR,DSP,ST,BLUE,DLX,4/PK,20PK/CS: Brand: MEDLINE

## (undated) DEVICE — SET FLD MGMT CTRL SYS INFLO TB AQUILEX

## (undated) DEVICE — SUTURE VICRYL SZ 0 L36IN ABSRB UD L36MM CT-1 1/2 CIR J946H

## (undated) DEVICE — DAVINCI: Brand: MEDLINE INDUSTRIES, INC.

## (undated) DEVICE — GOWN,PREVENTION PLUS,XLN/XL,ST,24/CS: Brand: MEDLINE

## (undated) DEVICE — SUTURE ABSORBABLE MONOFILAMENT 0 CT-2 12 IN STRATAFIX SPRL SXPP1B405

## (undated) DEVICE — DRAPE,UTILITY,XL,4/PK,STERILE: Brand: MEDLINE

## (undated) DEVICE — GLOVE SURG SZ 65 CRM LTX FREE POLYISOPRENE POLYMER BEAD ANTI

## (undated) DEVICE — SET FLD MGMT OUTFLO TB DISP FOR CTRL SYS AQUILEX

## (undated) DEVICE — SOLUTION ANTIFOG VIS SYS CLEARIFY LAPSCP